# Patient Record
Sex: FEMALE | Race: WHITE | URBAN - METROPOLITAN AREA
[De-identification: names, ages, dates, MRNs, and addresses within clinical notes are randomized per-mention and may not be internally consistent; named-entity substitution may affect disease eponyms.]

---

## 2021-11-02 ENCOUNTER — OFFICE VISIT (OUTPATIENT)
Dept: URBAN - METROPOLITAN AREA CLINIC 98 | Facility: CLINIC | Age: 76
End: 2021-11-02

## 2022-01-04 ENCOUNTER — OFFICE VISIT (OUTPATIENT)
Dept: URBAN - METROPOLITAN AREA CLINIC 98 | Facility: CLINIC | Age: 77
End: 2022-01-04
Payer: MEDICARE

## 2022-01-04 ENCOUNTER — WEB ENCOUNTER (OUTPATIENT)
Dept: URBAN - METROPOLITAN AREA CLINIC 98 | Facility: CLINIC | Age: 77
End: 2022-01-04

## 2022-01-04 VITALS
HEART RATE: 78 BPM | HEIGHT: 63 IN | TEMPERATURE: 98.1 F | WEIGHT: 168 LBS | BODY MASS INDEX: 29.77 KG/M2 | SYSTOLIC BLOOD PRESSURE: 145 MMHG | DIASTOLIC BLOOD PRESSURE: 82 MMHG

## 2022-01-04 DIAGNOSIS — K59.01 CONSTIPATION BY DELAYED COLONIC TRANSIT: ICD-10-CM

## 2022-01-04 PROCEDURE — 99202 OFFICE O/P NEW SF 15 MIN: CPT | Performed by: INTERNAL MEDICINE

## 2022-01-04 RX ORDER — HYOSCYAMINE SULFATE 0.12 MG/1
TAKE 1 TABLET BY MOUTH FOUR TIMES DAILY AS NEEDED FOR ABDOMINAL PAIN OR SPASM TABLET ORAL
Qty: 30 | Refills: 0 | Status: ACTIVE | COMMUNITY

## 2022-01-04 RX ORDER — TIZANIDINE 4 MG/1
TAKE 1 TABLET BY MOUTH EVERY 8 HOURS AS NEEDED TABLET ORAL
Qty: 90 | Refills: 0 | Status: ACTIVE | COMMUNITY

## 2022-01-04 RX ORDER — PANTOPRAZOLE 40 MG/1
TABLET, DELAYED RELEASE ORAL
Qty: 30 | Status: ACTIVE | COMMUNITY

## 2022-01-04 RX ORDER — GABAPENTIN 300 MG/1
TAKE 1 CAPSULE BY MOUTH AT BEDTIME CAPSULE ORAL
Qty: 30 | Refills: 0 | Status: ACTIVE | COMMUNITY

## 2022-01-04 RX ORDER — EPINEPHRINE 0.3 MG/.3ML
INJECTION SUBCUTANEOUS
Qty: 1 | Status: ACTIVE | COMMUNITY

## 2022-01-04 RX ORDER — SULFAMETHOXAZOLE AND TRIMETHOPRIM 400; 80 MG/1; MG/1
TABLET ORAL
Qty: 28 | Status: ACTIVE | COMMUNITY

## 2022-01-04 RX ORDER — CELECOXIB 200 MG/1
CAPSULE ORAL
Qty: 90 | Status: ACTIVE | COMMUNITY

## 2022-01-04 RX ORDER — TRAMADOL HYDROCHLORIDE 50 MG/1
TAKE 1 TABLET BY MOUTH FOUR TIMES DAILY TABLET, COATED ORAL
Qty: 120 | Refills: 0 | Status: ACTIVE | COMMUNITY

## 2022-01-04 RX ORDER — OFLOXACIN OTIC 3 MG/ML
INSTILL 1 DROP LEFT EYE FOUR TIMES DAILY FOR 5-7 DAYS SOLUTION AURICULAR (OTIC)
Qty: 5 | Refills: 0 | Status: ACTIVE | COMMUNITY

## 2022-01-04 RX ORDER — BUPROPION HYDROCHLORIDE 150 MG/1
TABLET, EXTENDED RELEASE ORAL
Qty: 90 | Status: ACTIVE | COMMUNITY

## 2022-01-04 RX ORDER — MONTELUKAST SODIUM 10 MG/1
TAKE 1 TABLET BY MOUTH EVERY NIGHT TABLET, FILM COATED ORAL
Qty: 90 | Refills: 0 | Status: ACTIVE | COMMUNITY

## 2022-01-04 RX ORDER — PRAVASTATIN SODIUM 40 MG/1
TABLET ORAL
Qty: 90 | Status: ACTIVE | COMMUNITY

## 2022-01-04 RX ORDER — TRAZODONE HYDROCHLORIDE 50 MG/1
TABLET ORAL
Qty: 90 | Status: ACTIVE | COMMUNITY

## 2022-01-04 RX ORDER — ACETAZOLAMIDE 125 MG/1
TAKE 1 TO 2 TABLETS BY MOUTH EVERY DAY AS DIRECTED TABLET ORAL
Qty: 30 | Refills: 0 | Status: ACTIVE | COMMUNITY

## 2022-01-04 RX ORDER — FLUCONAZOLE 150 MG/1
TAKE 1 TABLET BY MOUTH 1 TIME. REPEAT IN 3 DAYS IF STILL WITH SYMPTOMS TABLET ORAL
Qty: 2 | Refills: 0 | Status: ACTIVE | COMMUNITY

## 2022-01-04 RX ORDER — PREDNISONE 20 MG/1
TAKE 3 TABLETS BY MOUTH EVERY DAY TABLET ORAL
Qty: 15 | Refills: 0 | Status: ACTIVE | COMMUNITY

## 2022-01-04 RX ORDER — LEVOTHYROXINE SODIUM 0.17 MG/1
TABLET ORAL
Qty: 90 | Status: ACTIVE | COMMUNITY

## 2022-01-04 RX ORDER — ACETAMINOPHEN AND CODEINE PHOSPHATE 300; 30 MG/1; MG/1
TABLET ORAL
Qty: 15 | Status: ACTIVE | COMMUNITY

## 2022-01-04 RX ORDER — AMLODIPINE BESYLATE 5 MG/1
TAKE 1 TABLET BY MOUTH EVERY DAY TABLET ORAL
Qty: 5 | Refills: 1 | Status: ACTIVE | COMMUNITY

## 2022-01-04 RX ORDER — METHYLPREDNISOLONE 8 MG/1
TAKE 4 TABLETS 12 HOURS PRIOR TO EXAM AND 4 TABLETS 2 HOURS PRIOR TO EXAM TABLET ORAL
Qty: 8 | Refills: 0 | Status: ACTIVE | COMMUNITY

## 2022-01-04 RX ORDER — OXYCODONE HYDROCHLORIDE 5 MG/1
TABLET ORAL
Qty: 75 | Status: ACTIVE | COMMUNITY

## 2022-01-04 RX ORDER — HUMAN IMMUNOGLOBULIN G 0.2 G/ML
LIQUID SUBCUTANEOUS
Qty: 400 | Status: ACTIVE | COMMUNITY

## 2022-01-04 RX ORDER — HYDROCODONE BITARTRATE AND ACETAMINOPHEN 5; 325 MG/1; MG/1
TABLET ORAL
Qty: 40 | Status: ACTIVE | COMMUNITY

## 2022-01-04 RX ORDER — OXYCODONE HYDROCHLORIDE AND ACETAMINOPHEN 5; 325 MG/1; MG/1
TAKE 1 TO 2 TABLETS BY MOUTH EVERY 4 HOURS AS NEEDED FOR PAIN TABLET ORAL
Qty: 39 | Refills: 0 | Status: ACTIVE | COMMUNITY

## 2022-01-04 RX ORDER — BUDESONIDE AND FORMOTEROL FUMARATE DIHYDRATE 80; 4.5 UG/1; UG/1
AEROSOL RESPIRATORY (INHALATION)
Qty: 30.6 | Status: ACTIVE | COMMUNITY

## 2022-01-04 RX ORDER — ESTRADIOL 0.1 MG/G
_INSERT 1 GRAM VAGINALLY TWICE WEEKLY CREAM VAGINAL
Qty: 42.5 | Refills: 0 | Status: ACTIVE | COMMUNITY

## 2022-01-04 RX ORDER — VANCOMYCIN HYDROCHLORIDE 125 MG/1
TAKE 1 CAPSULE BY MOUTH EVERY 6 HOURS FOR 10 DAYS CAPSULE ORAL
Qty: 40 | Refills: 0 | Status: ACTIVE | COMMUNITY

## 2022-01-04 RX ORDER — CEFUROXIME AXETIL 500 MG/1
TAKE 1 TABLET BY MOUTH TWICE DAILY TABLET, FILM COATED ORAL
Qty: 20 | Refills: 0 | Status: ACTIVE | COMMUNITY

## 2022-01-04 RX ORDER — MELOXICAM 15 MG/1
TABLET ORAL
Qty: 30 | Status: ACTIVE | COMMUNITY

## 2022-01-04 RX ORDER — CLOTRIMAZOLE 1 G/100G
APPLY TO AFFECTED AREA 2 TIMES A DAY CREAM TOPICAL
Qty: 30 | Refills: 0 | Status: ACTIVE | COMMUNITY

## 2022-01-04 RX ORDER — BENAZEPRIL HYDROCHLORIDE AND HYDROCHLOROTHIAZIDE 20; 12.5 MG/1; MG/1
TABLET ORAL
Qty: 90 | Status: ACTIVE | COMMUNITY

## 2022-01-04 RX ORDER — BUTALBITAL, ACETAMINOPHEN AND CAFFEINE 40; 50; 300 MG/1; MG/1; MG/1
TAKE 1 CAPSULE BY MOUTH EVERY 8 HOURS AS NEEDED DIAGNOSIS UNAVAILABLE CAPSULE ORAL
Qty: 90 | Refills: 0 | Status: ACTIVE | COMMUNITY

## 2022-01-04 RX ORDER — DULOXETINE 30 MG/1
CAPSULE, DELAYED RELEASE ORAL
Qty: 90 | Status: ACTIVE | COMMUNITY

## 2022-01-04 NOTE — HPI-OTHER HISTORIES
Here to establish GI care.  pCP Dr iCndi Childress referred.   Prior ischemic colitis in 2019  Dr Abram Kearns Colorectal surgery  had had banding of hemorrhoids  did have hemorrhoidectomy 1998 . last colonoscopy 2019 25-30 surgeries [facility].has laxity in joints.  Had bladder suspension, hysterectomy, umbilical hernia repair as infant  bowels moving well with Miralax hemorrhoids are enlarged but no problems.

## 2022-01-04 NOTE — PHYSICAL EXAM HENT:
Head,  normocephalic,  atraumatic,  Face,  Face within normal limits,  Ears,  External ears within normal limits,   no

## 2022-04-18 ENCOUNTER — TELEPHONE ENCOUNTER (OUTPATIENT)
Dept: URBAN - METROPOLITAN AREA CLINIC 98 | Facility: CLINIC | Age: 77
End: 2022-04-18

## 2022-05-05 ENCOUNTER — ERX REFILL RESPONSE (OUTPATIENT)
Dept: URBAN - METROPOLITAN AREA CLINIC 98 | Facility: CLINIC | Age: 77
End: 2022-05-05

## 2022-05-05 ENCOUNTER — OFFICE VISIT (OUTPATIENT)
Dept: URBAN - METROPOLITAN AREA CLINIC 98 | Facility: CLINIC | Age: 77
End: 2022-05-05
Payer: MEDICARE

## 2022-05-05 VITALS
WEIGHT: 166 LBS | BODY MASS INDEX: 29.41 KG/M2 | DIASTOLIC BLOOD PRESSURE: 72 MMHG | SYSTOLIC BLOOD PRESSURE: 138 MMHG | HEIGHT: 63 IN | HEART RATE: 69 BPM | TEMPERATURE: 97.6 F

## 2022-05-05 DIAGNOSIS — R10.84 ABDOMINAL PAIN, GENERALIZED: ICD-10-CM

## 2022-05-05 DIAGNOSIS — K59.01 CONSTIPATION BY DELAYED COLONIC TRANSIT: ICD-10-CM

## 2022-05-05 PROBLEM — 35298007: Status: ACTIVE | Noted: 2022-01-04

## 2022-05-05 PROCEDURE — 99213 OFFICE O/P EST LOW 20 MIN: CPT | Performed by: INTERNAL MEDICINE

## 2022-05-05 RX ORDER — TRAZODONE HYDROCHLORIDE 50 MG/1
TABLET ORAL
Qty: 90 | Status: ACTIVE | COMMUNITY

## 2022-05-05 RX ORDER — METHYLPREDNISOLONE 8 MG/1
TAKE 4 TABLETS 12 HOURS PRIOR TO EXAM AND 4 TABLETS 2 HOURS PRIOR TO EXAM TABLET ORAL
Qty: 8 | Refills: 0 | Status: ACTIVE | COMMUNITY

## 2022-05-05 RX ORDER — AMLODIPINE BESYLATE 5 MG/1
TAKE 1 TABLET BY MOUTH EVERY DAY TABLET ORAL
Qty: 5 | Refills: 1 | Status: ACTIVE | COMMUNITY

## 2022-05-05 RX ORDER — ACETAMINOPHEN AND CODEINE PHOSPHATE 300; 30 MG/1; MG/1
TABLET ORAL
Qty: 15 | Status: ACTIVE | COMMUNITY

## 2022-05-05 RX ORDER — MELOXICAM 15 MG/1
TABLET ORAL
Qty: 30 | Status: ACTIVE | COMMUNITY

## 2022-05-05 RX ORDER — OFLOXACIN OTIC 3 MG/ML
INSTILL 1 DROP LEFT EYE FOUR TIMES DAILY FOR 5-7 DAYS SOLUTION AURICULAR (OTIC)
Qty: 5 | Refills: 0 | Status: ACTIVE | COMMUNITY

## 2022-05-05 RX ORDER — CLOTRIMAZOLE 1 G/100G
APPLY TO AFFECTED AREA 2 TIMES A DAY CREAM TOPICAL
Qty: 30 | Refills: 0 | Status: ACTIVE | COMMUNITY

## 2022-05-05 RX ORDER — VANCOMYCIN HYDROCHLORIDE 125 MG/1
TAKE 1 CAPSULE BY MOUTH EVERY 6 HOURS FOR 10 DAYS CAPSULE ORAL
Qty: 40 | Refills: 0 | Status: ACTIVE | COMMUNITY

## 2022-05-05 RX ORDER — HUMAN IMMUNOGLOBULIN G 0.2 G/ML
LIQUID SUBCUTANEOUS
Qty: 400 | Status: ACTIVE | COMMUNITY

## 2022-05-05 RX ORDER — BUDESONIDE AND FORMOTEROL FUMARATE DIHYDRATE 80; 4.5 UG/1; UG/1
AEROSOL RESPIRATORY (INHALATION)
Qty: 30.6 | Status: ACTIVE | COMMUNITY

## 2022-05-05 RX ORDER — BUTALBITAL, ACETAMINOPHEN AND CAFFEINE 40; 50; 300 MG/1; MG/1; MG/1
TAKE 1 CAPSULE BY MOUTH EVERY 8 HOURS AS NEEDED DIAGNOSIS UNAVAILABLE CAPSULE ORAL
Qty: 90 | Refills: 0 | Status: ACTIVE | COMMUNITY

## 2022-05-05 RX ORDER — SULFAMETHOXAZOLE AND TRIMETHOPRIM 400; 80 MG/1; MG/1
TABLET ORAL
Qty: 28 | Status: ACTIVE | COMMUNITY

## 2022-05-05 RX ORDER — ESTRADIOL 0.1 MG/G
_INSERT 1 GRAM VAGINALLY TWICE WEEKLY CREAM VAGINAL
Qty: 42.5 | Refills: 0 | Status: ACTIVE | COMMUNITY

## 2022-05-05 RX ORDER — PREDNISONE 20 MG/1
TAKE 3 TABLETS BY MOUTH EVERY DAY TABLET ORAL
Qty: 15 | Refills: 0 | Status: ACTIVE | COMMUNITY

## 2022-05-05 RX ORDER — ACETAZOLAMIDE 125 MG/1
TAKE 1 TO 2 TABLETS BY MOUTH EVERY DAY AS DIRECTED TABLET ORAL
Qty: 30 | Refills: 0 | Status: ACTIVE | COMMUNITY

## 2022-05-05 RX ORDER — PRAVASTATIN SODIUM 40 MG/1
TABLET ORAL
Qty: 90 | Status: ACTIVE | COMMUNITY

## 2022-05-05 RX ORDER — DICYCLOMINE HYDROCHLORIDE 20 MG/1
1 TABLET TABLET ORAL
Qty: 90 | Refills: 1 | OUTPATIENT

## 2022-05-05 RX ORDER — FLUCONAZOLE 150 MG/1
TAKE 1 TABLET BY MOUTH 1 TIME. REPEAT IN 3 DAYS IF STILL WITH SYMPTOMS TABLET ORAL
Qty: 2 | Refills: 0 | Status: ACTIVE | COMMUNITY

## 2022-05-05 RX ORDER — EPINEPHRINE 0.3 MG/.3ML
INJECTION SUBCUTANEOUS
Qty: 1 | Status: ACTIVE | COMMUNITY

## 2022-05-05 RX ORDER — DULOXETINE 30 MG/1
CAPSULE, DELAYED RELEASE ORAL
Qty: 90 | Status: ACTIVE | COMMUNITY

## 2022-05-05 RX ORDER — OXYCODONE HYDROCHLORIDE AND ACETAMINOPHEN 5; 325 MG/1; MG/1
TAKE 1 TO 2 TABLETS BY MOUTH EVERY 4 HOURS AS NEEDED FOR PAIN TABLET ORAL
Qty: 39 | Refills: 0 | Status: ACTIVE | COMMUNITY

## 2022-05-05 RX ORDER — GABAPENTIN 300 MG/1
TAKE 1 CAPSULE BY MOUTH AT BEDTIME CAPSULE ORAL
Qty: 30 | Refills: 0 | Status: ACTIVE | COMMUNITY

## 2022-05-05 RX ORDER — TRAMADOL HYDROCHLORIDE 50 MG/1
TAKE 1 TABLET BY MOUTH FOUR TIMES DAILY TABLET, COATED ORAL
Qty: 120 | Refills: 0 | Status: ACTIVE | COMMUNITY

## 2022-05-05 RX ORDER — MONTELUKAST SODIUM 10 MG/1
TAKE 1 TABLET BY MOUTH EVERY NIGHT TABLET, FILM COATED ORAL
Qty: 90 | Refills: 0 | Status: ACTIVE | COMMUNITY

## 2022-05-05 RX ORDER — HYDROCODONE BITARTRATE AND ACETAMINOPHEN 5; 325 MG/1; MG/1
TABLET ORAL
Qty: 40 | Status: ACTIVE | COMMUNITY

## 2022-05-05 RX ORDER — OXYCODONE HYDROCHLORIDE 5 MG/1
TABLET ORAL
Qty: 75 | Status: ACTIVE | COMMUNITY

## 2022-05-05 RX ORDER — DICYCLOMINE HYDROCHLORIDE 20 MG/1
TAKE 1 TABLET BY MOUTH THREE TIMES DAILY AS NEEDED FOR PAIN TABLET ORAL
Qty: 270 TABLET | Refills: 1 | OUTPATIENT

## 2022-05-05 RX ORDER — PANTOPRAZOLE 40 MG/1
TABLET, DELAYED RELEASE ORAL
Qty: 30 | Status: ACTIVE | COMMUNITY

## 2022-05-05 RX ORDER — LEVOTHYROXINE SODIUM 0.17 MG/1
TABLET ORAL
Qty: 90 | Status: ACTIVE | COMMUNITY

## 2022-05-05 RX ORDER — CEFUROXIME AXETIL 500 MG/1
TAKE 1 TABLET BY MOUTH TWICE DAILY TABLET, FILM COATED ORAL
Qty: 20 | Refills: 0 | Status: ACTIVE | COMMUNITY

## 2022-05-05 RX ORDER — BUPROPION HYDROCHLORIDE 150 MG/1
TABLET, EXTENDED RELEASE ORAL
Qty: 90 | Status: ACTIVE | COMMUNITY

## 2022-05-05 RX ORDER — HYOSCYAMINE SULFATE 0.12 MG/1
TAKE 1 TABLET BY MOUTH FOUR TIMES DAILY AS NEEDED FOR ABDOMINAL PAIN OR SPASM TABLET ORAL
Qty: 30 | Refills: 0 | Status: ACTIVE | COMMUNITY

## 2022-05-05 RX ORDER — DICYCLOMINE HYDROCHLORIDE 20 MG/1
1 TABLET TABLET ORAL
Qty: 90 | Refills: 1 | OUTPATIENT
Start: 2022-05-05 | End: 2022-07-04

## 2022-05-05 RX ORDER — TIZANIDINE 4 MG/1
TAKE 1 TABLET BY MOUTH EVERY 8 HOURS AS NEEDED TABLET ORAL
Qty: 90 | Refills: 0 | Status: ACTIVE | COMMUNITY

## 2022-05-05 RX ORDER — BENAZEPRIL HYDROCHLORIDE AND HYDROCHLOROTHIAZIDE 20; 12.5 MG/1; MG/1
TABLET ORAL
Qty: 90 | Status: ACTIVE | COMMUNITY

## 2022-05-05 RX ORDER — CELECOXIB 200 MG/1
CAPSULE ORAL
Qty: 90 | Status: ACTIVE | COMMUNITY

## 2022-05-05 NOTE — HPI-OTHER HISTORIES
had hernia surgery 4/8: reports that post op was awful - with terrible gas pains.   a little constipated from time to time  passing gas helps or having bm does have prolapse  Dr Childress gave her hyoscyamine which helps somewhat but not completely  swims  nearly every day

## 2022-11-16 NOTE — PHYSICAL EXAM PSYCH:
----- Message from Aguilavladimir Therese sent at 11/16/2022  9:39 AM EST -----  Subject: Refill Request    QUESTIONS  Name of Medication? levothyroxine (SYNTHROID) 150 MCG tablet  Patient-reported dosage and instructions? 150mcg, 1 daily  How many days do you have left? 7  Preferred Pharmacy? [address] phone number (if available)? [phone]  ---------------------------------------------------------------------------  --------------  CALL BACK INFO  What is the best way for the office to contact you? OK to leave message on   voicemail  Preferred Call Back Phone Number? [phone]  ---------------------------------------------------------------------------  --------------  SCRIPT ANSWERS  Relationship to Patient?  Self normal mood with appropriate affect , cooperative with exam , cognitive function intact , judgement and insight good

## 2022-12-05 ENCOUNTER — LAB OUTSIDE AN ENCOUNTER (OUTPATIENT)
Dept: URBAN - METROPOLITAN AREA CLINIC 98 | Facility: CLINIC | Age: 77
End: 2022-12-05

## 2022-12-05 ENCOUNTER — OFFICE VISIT (OUTPATIENT)
Dept: URBAN - METROPOLITAN AREA CLINIC 98 | Facility: CLINIC | Age: 77
End: 2022-12-05
Payer: MEDICARE

## 2022-12-05 ENCOUNTER — WEB ENCOUNTER (OUTPATIENT)
Dept: URBAN - METROPOLITAN AREA CLINIC 98 | Facility: CLINIC | Age: 77
End: 2022-12-05

## 2022-12-05 VITALS
DIASTOLIC BLOOD PRESSURE: 85 MMHG | HEART RATE: 69 BPM | WEIGHT: 167 LBS | TEMPERATURE: 97.9 F | BODY MASS INDEX: 29.59 KG/M2 | SYSTOLIC BLOOD PRESSURE: 154 MMHG | HEIGHT: 63 IN

## 2022-12-05 DIAGNOSIS — K92.1 HEMATOCHEZIA: ICD-10-CM

## 2022-12-05 DIAGNOSIS — R19.7 DIARRHEA OF PRESUMED INFECTIOUS ORIGIN: ICD-10-CM

## 2022-12-05 PROCEDURE — 99214 OFFICE O/P EST MOD 30 MIN: CPT | Performed by: INTERNAL MEDICINE

## 2022-12-05 RX ORDER — CLOTRIMAZOLE 1 G/100G
APPLY TO AFFECTED AREA 2 TIMES A DAY CREAM TOPICAL
Qty: 30 | Refills: 0 | Status: ACTIVE | COMMUNITY

## 2022-12-05 RX ORDER — OXYCODONE HYDROCHLORIDE AND ACETAMINOPHEN 5; 325 MG/1; MG/1
TAKE 1 TO 2 TABLETS BY MOUTH EVERY 4 HOURS AS NEEDED FOR PAIN TABLET ORAL
Qty: 39 | Refills: 0 | Status: ACTIVE | COMMUNITY

## 2022-12-05 RX ORDER — AMLODIPINE BESYLATE 5 MG/1
TAKE 1 TABLET BY MOUTH EVERY DAY TABLET ORAL
Qty: 5 | Refills: 1 | Status: ACTIVE | COMMUNITY

## 2022-12-05 RX ORDER — GABAPENTIN 300 MG/1
TAKE 1 CAPSULE BY MOUTH AT BEDTIME CAPSULE ORAL
Qty: 30 | Refills: 0 | Status: ACTIVE | COMMUNITY

## 2022-12-05 RX ORDER — ACETAZOLAMIDE 125 MG/1
TAKE 1 TO 2 TABLETS BY MOUTH EVERY DAY AS DIRECTED TABLET ORAL
Qty: 30 | Refills: 0 | Status: ACTIVE | COMMUNITY

## 2022-12-05 RX ORDER — CELECOXIB 200 MG/1
CAPSULE ORAL
Qty: 90 | Status: ACTIVE | COMMUNITY

## 2022-12-05 RX ORDER — PREDNISONE 20 MG/1
TAKE 3 TABLETS BY MOUTH EVERY DAY TABLET ORAL
Qty: 15 | Refills: 0 | Status: ACTIVE | COMMUNITY

## 2022-12-05 RX ORDER — TRAMADOL HYDROCHLORIDE 50 MG/1
TAKE 1 TABLET BY MOUTH FOUR TIMES DAILY TABLET, COATED ORAL
Qty: 120 | Refills: 0 | Status: ACTIVE | COMMUNITY

## 2022-12-05 RX ORDER — BUTALBITAL, ACETAMINOPHEN AND CAFFEINE 40; 50; 300 MG/1; MG/1; MG/1
TAKE 1 CAPSULE BY MOUTH EVERY 8 HOURS AS NEEDED DIAGNOSIS UNAVAILABLE CAPSULE ORAL
Qty: 90 | Refills: 0 | Status: ACTIVE | COMMUNITY

## 2022-12-05 RX ORDER — PRAVASTATIN SODIUM 40 MG/1
TABLET ORAL
Qty: 90 | Status: ACTIVE | COMMUNITY

## 2022-12-05 RX ORDER — BUPROPION HYDROCHLORIDE 150 MG/1
TABLET, EXTENDED RELEASE ORAL
Qty: 90 | Status: ACTIVE | COMMUNITY

## 2022-12-05 RX ORDER — PANTOPRAZOLE 40 MG/1
TABLET, DELAYED RELEASE ORAL
Qty: 30 | Status: ACTIVE | COMMUNITY

## 2022-12-05 RX ORDER — EPINEPHRINE 0.3 MG/.3ML
INJECTION SUBCUTANEOUS
Qty: 1 | Status: ACTIVE | COMMUNITY

## 2022-12-05 RX ORDER — METHYLPREDNISOLONE 8 MG/1
TAKE 4 TABLETS 12 HOURS PRIOR TO EXAM AND 4 TABLETS 2 HOURS PRIOR TO EXAM TABLET ORAL
Qty: 8 | Refills: 0 | Status: ACTIVE | COMMUNITY

## 2022-12-05 RX ORDER — BENAZEPRIL HYDROCHLORIDE AND HYDROCHLOROTHIAZIDE 20; 12.5 MG/1; MG/1
TABLET ORAL
Qty: 90 | Status: ACTIVE | COMMUNITY

## 2022-12-05 RX ORDER — VANCOMYCIN HYDROCHLORIDE 125 MG/1
TAKE 1 CAPSULE BY MOUTH EVERY 6 HOURS FOR 10 DAYS CAPSULE ORAL
Qty: 40 | Refills: 0 | Status: ACTIVE | COMMUNITY

## 2022-12-05 RX ORDER — TRAZODONE HYDROCHLORIDE 50 MG/1
TABLET ORAL
Qty: 90 | Status: ACTIVE | COMMUNITY

## 2022-12-05 RX ORDER — TIZANIDINE 4 MG/1
TAKE 1 TABLET BY MOUTH EVERY 8 HOURS AS NEEDED TABLET ORAL
Qty: 90 | Refills: 0 | Status: ACTIVE | COMMUNITY

## 2022-12-05 RX ORDER — HUMAN IMMUNOGLOBULIN G 0.2 G/ML
LIQUID SUBCUTANEOUS
Qty: 400 | Status: ACTIVE | COMMUNITY

## 2022-12-05 RX ORDER — CEFUROXIME AXETIL 500 MG/1
TAKE 1 TABLET BY MOUTH TWICE DAILY TABLET, FILM COATED ORAL
Qty: 20 | Refills: 0 | Status: ACTIVE | COMMUNITY

## 2022-12-05 RX ORDER — MELOXICAM 15 MG/1
TABLET ORAL
Qty: 30 | Status: ACTIVE | COMMUNITY

## 2022-12-05 RX ORDER — LEVOTHYROXINE SODIUM 0.17 MG/1
TABLET ORAL
Qty: 90 | Status: ACTIVE | COMMUNITY

## 2022-12-05 RX ORDER — HYDROCODONE BITARTRATE AND ACETAMINOPHEN 5; 325 MG/1; MG/1
TABLET ORAL
Qty: 40 | Status: ACTIVE | COMMUNITY

## 2022-12-05 RX ORDER — FLUCONAZOLE 150 MG/1
TAKE 1 TABLET BY MOUTH 1 TIME. REPEAT IN 3 DAYS IF STILL WITH SYMPTOMS TABLET ORAL
Qty: 2 | Refills: 0 | Status: ACTIVE | COMMUNITY

## 2022-12-05 RX ORDER — MONTELUKAST SODIUM 10 MG/1
TAKE 1 TABLET BY MOUTH EVERY NIGHT TABLET, FILM COATED ORAL
Qty: 90 | Refills: 0 | Status: ACTIVE | COMMUNITY

## 2022-12-05 RX ORDER — SULFAMETHOXAZOLE AND TRIMETHOPRIM 400; 80 MG/1; MG/1
TABLET ORAL
Qty: 28 | Status: ACTIVE | COMMUNITY

## 2022-12-05 RX ORDER — DULOXETINE 30 MG/1
CAPSULE, DELAYED RELEASE ORAL
Qty: 90 | Status: ACTIVE | COMMUNITY

## 2022-12-05 RX ORDER — OFLOXACIN OTIC 3 MG/ML
INSTILL 1 DROP LEFT EYE FOUR TIMES DAILY FOR 5-7 DAYS SOLUTION AURICULAR (OTIC)
Qty: 5 | Refills: 0 | Status: ACTIVE | COMMUNITY

## 2022-12-05 RX ORDER — ACETAMINOPHEN AND CODEINE PHOSPHATE 300; 30 MG/1; MG/1
TABLET ORAL
Qty: 15 | Status: ACTIVE | COMMUNITY

## 2022-12-05 RX ORDER — DICYCLOMINE HYDROCHLORIDE 20 MG/1
TAKE 1 TABLET BY MOUTH THREE TIMES DAILY AS NEEDED FOR PAIN TABLET ORAL
Qty: 270 TABLET | Refills: 1 | Status: ACTIVE | COMMUNITY

## 2022-12-05 RX ORDER — OXYCODONE HYDROCHLORIDE 5 MG/1
TABLET ORAL
Qty: 75 | Status: ACTIVE | COMMUNITY

## 2022-12-05 RX ORDER — ESTRADIOL 0.1 MG/G
_INSERT 1 GRAM VAGINALLY TWICE WEEKLY CREAM VAGINAL
Qty: 42.5 | Refills: 0 | Status: ACTIVE | COMMUNITY

## 2022-12-05 RX ORDER — HYOSCYAMINE SULFATE 0.12 MG/1
TAKE 1 TABLET BY MOUTH FOUR TIMES DAILY AS NEEDED FOR ABDOMINAL PAIN OR SPASM TABLET ORAL
Qty: 30 | Refills: 0 | Status: ACTIVE | COMMUNITY

## 2022-12-05 RX ORDER — BUDESONIDE AND FORMOTEROL FUMARATE DIHYDRATE 80; 4.5 UG/1; UG/1
AEROSOL RESPIRATORY (INHALATION)
Qty: 30.6 | Status: ACTIVE | COMMUNITY

## 2022-12-05 NOTE — HPI-TODAY'S VISIT:
Friday 1 wk ago - got bad pain in abdomen.  had black stools for weeks - then had red stool went to see Dr Childress last week  went to [facility] ER Friday 12/2.   Admitted and stayed overnight- sx continued, still w diarrhea and balck stools  stopped celebrex BM 3-4 x / week  stool samples taken but only tested for heme not infection - i reviewed NS chart

## 2022-12-07 ENCOUNTER — OFFICE VISIT (OUTPATIENT)
Dept: URBAN - METROPOLITAN AREA SURGERY CENTER 18 | Facility: SURGERY CENTER | Age: 77
End: 2022-12-07

## 2022-12-07 ENCOUNTER — CLAIMS CREATED FROM THE CLAIM WINDOW (OUTPATIENT)
Dept: URBAN - METROPOLITAN AREA SURGERY CENTER 18 | Facility: SURGERY CENTER | Age: 77
End: 2022-12-07
Payer: MEDICARE

## 2022-12-07 ENCOUNTER — CLAIMS CREATED FROM THE CLAIM WINDOW (OUTPATIENT)
Dept: URBAN - METROPOLITAN AREA CLINIC 4 | Facility: CLINIC | Age: 77
End: 2022-12-07
Payer: MEDICARE

## 2022-12-07 DIAGNOSIS — K31.89 ACQUIRED DEFORMITY OF DUODENUM: ICD-10-CM

## 2022-12-07 DIAGNOSIS — K31.89 OTHER DISEASES OF STOMACH AND DUODENUM: ICD-10-CM

## 2022-12-07 DIAGNOSIS — K21.9 ACID REFLUX: ICD-10-CM

## 2022-12-07 DIAGNOSIS — R19.7 ACUTE DIARRHEA: ICD-10-CM

## 2022-12-07 DIAGNOSIS — D12.2 ADENOMA OF ASCENDING COLON: ICD-10-CM

## 2022-12-07 DIAGNOSIS — K29.70 GASTRITIS, UNSPECIFIED, WITHOUT BLEEDING: ICD-10-CM

## 2022-12-07 PROCEDURE — G8907 PT DOC NO EVENTS ON DISCHARG: HCPCS | Performed by: INTERNAL MEDICINE

## 2022-12-07 PROCEDURE — 43239 EGD BIOPSY SINGLE/MULTIPLE: CPT | Performed by: INTERNAL MEDICINE

## 2022-12-07 PROCEDURE — 45380 COLONOSCOPY AND BIOPSY: CPT | Performed by: INTERNAL MEDICINE

## 2022-12-07 PROCEDURE — 88305 TISSUE EXAM BY PATHOLOGIST: CPT | Performed by: PATHOLOGY

## 2022-12-07 PROCEDURE — 88312 SPECIAL STAINS GROUP 1: CPT | Performed by: PATHOLOGY

## 2022-12-07 RX ORDER — CEFUROXIME AXETIL 500 MG/1
TAKE 1 TABLET BY MOUTH TWICE DAILY TABLET, FILM COATED ORAL
Qty: 20 | Refills: 0 | Status: ACTIVE | COMMUNITY

## 2022-12-07 RX ORDER — HYDROCODONE BITARTRATE AND ACETAMINOPHEN 5; 325 MG/1; MG/1
TABLET ORAL
Qty: 40 | Status: ACTIVE | COMMUNITY

## 2022-12-07 RX ORDER — ACETAMINOPHEN AND CODEINE PHOSPHATE 300; 30 MG/1; MG/1
TABLET ORAL
Qty: 15 | Status: ACTIVE | COMMUNITY

## 2022-12-07 RX ORDER — MONTELUKAST SODIUM 10 MG/1
TAKE 1 TABLET BY MOUTH EVERY NIGHT TABLET, FILM COATED ORAL
Qty: 90 | Refills: 0 | Status: ACTIVE | COMMUNITY

## 2022-12-07 RX ORDER — BUPROPION HYDROCHLORIDE 150 MG/1
TABLET, EXTENDED RELEASE ORAL
Qty: 90 | Status: ACTIVE | COMMUNITY

## 2022-12-07 RX ORDER — SULFAMETHOXAZOLE AND TRIMETHOPRIM 400; 80 MG/1; MG/1
TABLET ORAL
Qty: 28 | Status: ACTIVE | COMMUNITY

## 2022-12-07 RX ORDER — BENAZEPRIL HYDROCHLORIDE AND HYDROCHLOROTHIAZIDE 20; 12.5 MG/1; MG/1
TABLET ORAL
Qty: 90 | Status: ACTIVE | COMMUNITY

## 2022-12-07 RX ORDER — MELOXICAM 15 MG/1
TABLET ORAL
Qty: 30 | Status: ACTIVE | COMMUNITY

## 2022-12-07 RX ORDER — METHYLPREDNISOLONE 8 MG/1
TAKE 4 TABLETS 12 HOURS PRIOR TO EXAM AND 4 TABLETS 2 HOURS PRIOR TO EXAM TABLET ORAL
Qty: 8 | Refills: 0 | Status: ACTIVE | COMMUNITY

## 2022-12-07 RX ORDER — FLUCONAZOLE 150 MG/1
TAKE 1 TABLET BY MOUTH 1 TIME. REPEAT IN 3 DAYS IF STILL WITH SYMPTOMS TABLET ORAL
Qty: 2 | Refills: 0 | Status: ACTIVE | COMMUNITY

## 2022-12-07 RX ORDER — HYOSCYAMINE SULFATE 0.12 MG/1
TAKE 1 TABLET BY MOUTH FOUR TIMES DAILY AS NEEDED FOR ABDOMINAL PAIN OR SPASM TABLET ORAL
Qty: 30 | Refills: 0 | Status: ACTIVE | COMMUNITY

## 2022-12-07 RX ORDER — TRAZODONE HYDROCHLORIDE 50 MG/1
TABLET ORAL
Qty: 90 | Status: ACTIVE | COMMUNITY

## 2022-12-07 RX ORDER — EPINEPHRINE 0.3 MG/.3ML
INJECTION SUBCUTANEOUS
Qty: 1 | Status: ACTIVE | COMMUNITY

## 2022-12-07 RX ORDER — BUTALBITAL, ACETAMINOPHEN AND CAFFEINE 40; 50; 300 MG/1; MG/1; MG/1
TAKE 1 CAPSULE BY MOUTH EVERY 8 HOURS AS NEEDED DIAGNOSIS UNAVAILABLE CAPSULE ORAL
Qty: 90 | Refills: 0 | Status: ACTIVE | COMMUNITY

## 2022-12-07 RX ORDER — VANCOMYCIN HYDROCHLORIDE 125 MG/1
TAKE 1 CAPSULE BY MOUTH EVERY 6 HOURS FOR 10 DAYS CAPSULE ORAL
Qty: 40 | Refills: 0 | Status: ACTIVE | COMMUNITY

## 2022-12-07 RX ORDER — TIZANIDINE 4 MG/1
TAKE 1 TABLET BY MOUTH EVERY 8 HOURS AS NEEDED TABLET ORAL
Qty: 90 | Refills: 0 | Status: ACTIVE | COMMUNITY

## 2022-12-07 RX ORDER — PRAVASTATIN SODIUM 40 MG/1
TABLET ORAL
Qty: 90 | Status: ACTIVE | COMMUNITY

## 2022-12-07 RX ORDER — LEVOTHYROXINE SODIUM 0.17 MG/1
TABLET ORAL
Qty: 90 | Status: ACTIVE | COMMUNITY

## 2022-12-07 RX ORDER — DULOXETINE 30 MG/1
CAPSULE, DELAYED RELEASE ORAL
Qty: 90 | Status: ACTIVE | COMMUNITY

## 2022-12-07 RX ORDER — TRAMADOL HYDROCHLORIDE 50 MG/1
TAKE 1 TABLET BY MOUTH FOUR TIMES DAILY TABLET, COATED ORAL
Qty: 120 | Refills: 0 | Status: ACTIVE | COMMUNITY

## 2022-12-07 RX ORDER — PREDNISONE 20 MG/1
TAKE 3 TABLETS BY MOUTH EVERY DAY TABLET ORAL
Qty: 15 | Refills: 0 | Status: ACTIVE | COMMUNITY

## 2022-12-07 RX ORDER — AMLODIPINE BESYLATE 5 MG/1
TAKE 1 TABLET BY MOUTH EVERY DAY TABLET ORAL
Qty: 5 | Refills: 1 | Status: ACTIVE | COMMUNITY

## 2022-12-07 RX ORDER — CLOTRIMAZOLE 1 G/100G
APPLY TO AFFECTED AREA 2 TIMES A DAY CREAM TOPICAL
Qty: 30 | Refills: 0 | Status: ACTIVE | COMMUNITY

## 2022-12-07 RX ORDER — OXYCODONE HYDROCHLORIDE AND ACETAMINOPHEN 5; 325 MG/1; MG/1
TAKE 1 TO 2 TABLETS BY MOUTH EVERY 4 HOURS AS NEEDED FOR PAIN TABLET ORAL
Qty: 39 | Refills: 0 | Status: ACTIVE | COMMUNITY

## 2022-12-07 RX ORDER — PANTOPRAZOLE 40 MG/1
TABLET, DELAYED RELEASE ORAL
Qty: 30 | Status: ACTIVE | COMMUNITY

## 2022-12-07 RX ORDER — GABAPENTIN 300 MG/1
TAKE 1 CAPSULE BY MOUTH AT BEDTIME CAPSULE ORAL
Qty: 30 | Refills: 0 | Status: ACTIVE | COMMUNITY

## 2022-12-07 RX ORDER — OXYCODONE HYDROCHLORIDE 5 MG/1
TABLET ORAL
Qty: 75 | Status: ACTIVE | COMMUNITY

## 2022-12-07 RX ORDER — DICYCLOMINE HYDROCHLORIDE 20 MG/1
TAKE 1 TABLET BY MOUTH THREE TIMES DAILY AS NEEDED FOR PAIN TABLET ORAL
Qty: 270 TABLET | Refills: 1 | Status: ACTIVE | COMMUNITY

## 2022-12-07 RX ORDER — BUDESONIDE AND FORMOTEROL FUMARATE DIHYDRATE 80; 4.5 UG/1; UG/1
AEROSOL RESPIRATORY (INHALATION)
Qty: 30.6 | Status: ACTIVE | COMMUNITY

## 2022-12-07 RX ORDER — OFLOXACIN OTIC 3 MG/ML
INSTILL 1 DROP LEFT EYE FOUR TIMES DAILY FOR 5-7 DAYS SOLUTION AURICULAR (OTIC)
Qty: 5 | Refills: 0 | Status: ACTIVE | COMMUNITY

## 2022-12-07 RX ORDER — ACETAZOLAMIDE 125 MG/1
TAKE 1 TO 2 TABLETS BY MOUTH EVERY DAY AS DIRECTED TABLET ORAL
Qty: 30 | Refills: 0 | Status: ACTIVE | COMMUNITY

## 2022-12-07 RX ORDER — ESTRADIOL 0.1 MG/G
_INSERT 1 GRAM VAGINALLY TWICE WEEKLY CREAM VAGINAL
Qty: 42.5 | Refills: 0 | Status: ACTIVE | COMMUNITY

## 2022-12-07 RX ORDER — HUMAN IMMUNOGLOBULIN G 0.2 G/ML
LIQUID SUBCUTANEOUS
Qty: 400 | Status: ACTIVE | COMMUNITY

## 2022-12-07 RX ORDER — CELECOXIB 200 MG/1
CAPSULE ORAL
Qty: 90 | Status: ACTIVE | COMMUNITY

## 2022-12-12 ENCOUNTER — TELEPHONE ENCOUNTER (OUTPATIENT)
Dept: URBAN - METROPOLITAN AREA CLINIC 98 | Facility: CLINIC | Age: 77
End: 2022-12-12

## 2022-12-12 ENCOUNTER — WEB ENCOUNTER (OUTPATIENT)
Dept: URBAN - METROPOLITAN AREA CLINIC 98 | Facility: CLINIC | Age: 77
End: 2022-12-12

## 2022-12-12 RX ORDER — ONDANSETRON 4 MG/1
1-2 TABLET ON THE TONGUE AND ALLOW TO DISSOLVE TABLET, ORALLY DISINTEGRATING ORAL
Qty: 60 | Refills: 1 | OUTPATIENT

## 2022-12-13 ENCOUNTER — WEB ENCOUNTER (OUTPATIENT)
Dept: URBAN - METROPOLITAN AREA CLINIC 98 | Facility: CLINIC | Age: 77
End: 2022-12-13

## 2022-12-27 ENCOUNTER — TELEPHONE ENCOUNTER (OUTPATIENT)
Dept: URBAN - METROPOLITAN AREA CLINIC 98 | Facility: CLINIC | Age: 77
End: 2022-12-27

## 2022-12-27 ENCOUNTER — OFFICE VISIT (OUTPATIENT)
Dept: URBAN - METROPOLITAN AREA CLINIC 98 | Facility: CLINIC | Age: 77
End: 2022-12-27

## 2022-12-27 RX ORDER — CELECOXIB 200 MG/1
CAPSULE ORAL
Qty: 90 | Status: ACTIVE | COMMUNITY

## 2022-12-27 RX ORDER — MONTELUKAST SODIUM 10 MG/1
TAKE 1 TABLET BY MOUTH EVERY NIGHT TABLET, FILM COATED ORAL
Qty: 90 | Refills: 0 | Status: ACTIVE | COMMUNITY

## 2022-12-27 RX ORDER — ESTRADIOL 0.1 MG/G
_INSERT 1 GRAM VAGINALLY TWICE WEEKLY CREAM VAGINAL
Qty: 42.5 | Refills: 0 | Status: ACTIVE | COMMUNITY

## 2022-12-27 RX ORDER — OXYCODONE HYDROCHLORIDE 5 MG/1
TABLET ORAL
Qty: 75 | Status: ACTIVE | COMMUNITY

## 2022-12-27 RX ORDER — ONDANSETRON 4 MG/1
1-2 TABLET ON THE TONGUE AND ALLOW TO DISSOLVE TABLET, ORALLY DISINTEGRATING ORAL
Qty: 60 | Refills: 1 | Status: ACTIVE | COMMUNITY

## 2022-12-27 RX ORDER — MELOXICAM 15 MG/1
TABLET ORAL
Qty: 30 | Status: ACTIVE | COMMUNITY

## 2022-12-27 RX ORDER — LEVOTHYROXINE SODIUM 0.17 MG/1
TABLET ORAL
Qty: 90 | Status: ACTIVE | COMMUNITY

## 2022-12-27 RX ORDER — EPINEPHRINE 0.3 MG/.3ML
INJECTION SUBCUTANEOUS
Qty: 1 | Status: ACTIVE | COMMUNITY

## 2022-12-27 RX ORDER — ACETAZOLAMIDE 125 MG/1
TAKE 1 TO 2 TABLETS BY MOUTH EVERY DAY AS DIRECTED TABLET ORAL
Qty: 30 | Refills: 0 | Status: ACTIVE | COMMUNITY

## 2022-12-27 RX ORDER — DICYCLOMINE HYDROCHLORIDE 20 MG/1
TAKE 1 TABLET BY MOUTH THREE TIMES DAILY AS NEEDED FOR PAIN TABLET ORAL
Qty: 270 TABLET | Refills: 1 | Status: ACTIVE | COMMUNITY

## 2022-12-27 RX ORDER — FLUCONAZOLE 150 MG/1
TAKE 1 TABLET BY MOUTH 1 TIME. REPEAT IN 3 DAYS IF STILL WITH SYMPTOMS TABLET ORAL
Qty: 2 | Refills: 0 | Status: ACTIVE | COMMUNITY

## 2022-12-27 RX ORDER — CEFUROXIME AXETIL 500 MG/1
TAKE 1 TABLET BY MOUTH TWICE DAILY TABLET, FILM COATED ORAL
Qty: 20 | Refills: 0 | Status: ACTIVE | COMMUNITY

## 2022-12-27 RX ORDER — DULOXETINE 30 MG/1
CAPSULE, DELAYED RELEASE ORAL
Qty: 90 | Status: ACTIVE | COMMUNITY

## 2022-12-27 RX ORDER — OFLOXACIN OTIC 3 MG/ML
INSTILL 1 DROP LEFT EYE FOUR TIMES DAILY FOR 5-7 DAYS SOLUTION AURICULAR (OTIC)
Qty: 5 | Refills: 0 | Status: ACTIVE | COMMUNITY

## 2022-12-27 RX ORDER — PANTOPRAZOLE 40 MG/1
TABLET, DELAYED RELEASE ORAL
Qty: 30 | Status: ACTIVE | COMMUNITY

## 2022-12-27 RX ORDER — ACETAMINOPHEN AND CODEINE PHOSPHATE 300; 30 MG/1; MG/1
TABLET ORAL
Qty: 15 | Status: ACTIVE | COMMUNITY

## 2022-12-27 RX ORDER — BUTALBITAL, ACETAMINOPHEN AND CAFFEINE 40; 50; 300 MG/1; MG/1; MG/1
TAKE 1 CAPSULE BY MOUTH EVERY 8 HOURS AS NEEDED DIAGNOSIS UNAVAILABLE CAPSULE ORAL
Qty: 90 | Refills: 0 | Status: ACTIVE | COMMUNITY

## 2022-12-27 RX ORDER — HUMAN IMMUNOGLOBULIN G 0.2 G/ML
LIQUID SUBCUTANEOUS
Qty: 400 | Status: ACTIVE | COMMUNITY

## 2022-12-27 RX ORDER — TRAMADOL HYDROCHLORIDE 50 MG/1
TAKE 1 TABLET BY MOUTH FOUR TIMES DAILY TABLET, COATED ORAL
Qty: 120 | Refills: 0 | Status: ACTIVE | COMMUNITY

## 2022-12-27 RX ORDER — METHYLPREDNISOLONE 8 MG/1
TAKE 4 TABLETS 12 HOURS PRIOR TO EXAM AND 4 TABLETS 2 HOURS PRIOR TO EXAM TABLET ORAL
Qty: 8 | Refills: 0 | Status: ACTIVE | COMMUNITY

## 2022-12-27 RX ORDER — BUPROPION HYDROCHLORIDE 150 MG/1
TABLET, EXTENDED RELEASE ORAL
Qty: 90 | Status: ACTIVE | COMMUNITY

## 2022-12-27 RX ORDER — BUDESONIDE AND FORMOTEROL FUMARATE DIHYDRATE 80; 4.5 UG/1; UG/1
AEROSOL RESPIRATORY (INHALATION)
Qty: 30.6 | Status: ACTIVE | COMMUNITY

## 2022-12-27 RX ORDER — GABAPENTIN 300 MG/1
TAKE 1 CAPSULE BY MOUTH AT BEDTIME CAPSULE ORAL
Qty: 30 | Refills: 0 | Status: ACTIVE | COMMUNITY

## 2022-12-27 RX ORDER — PRAVASTATIN SODIUM 40 MG/1
TABLET ORAL
Qty: 90 | Status: ACTIVE | COMMUNITY

## 2022-12-27 RX ORDER — HYOSCYAMINE SULFATE 0.12 MG/1
TAKE 1 TABLET BY MOUTH FOUR TIMES DAILY AS NEEDED FOR ABDOMINAL PAIN OR SPASM TABLET ORAL
Qty: 30 | Refills: 0 | Status: ACTIVE | COMMUNITY

## 2022-12-27 RX ORDER — OXYCODONE HYDROCHLORIDE AND ACETAMINOPHEN 5; 325 MG/1; MG/1
TAKE 1 TO 2 TABLETS BY MOUTH EVERY 4 HOURS AS NEEDED FOR PAIN TABLET ORAL
Qty: 39 | Refills: 0 | Status: ACTIVE | COMMUNITY

## 2022-12-27 RX ORDER — SULFAMETHOXAZOLE AND TRIMETHOPRIM 400; 80 MG/1; MG/1
TABLET ORAL
Qty: 28 | Status: ACTIVE | COMMUNITY

## 2022-12-27 RX ORDER — PREDNISONE 20 MG/1
TAKE 3 TABLETS BY MOUTH EVERY DAY TABLET ORAL
Qty: 15 | Refills: 0 | Status: ACTIVE | COMMUNITY

## 2022-12-27 RX ORDER — BENAZEPRIL HYDROCHLORIDE AND HYDROCHLOROTHIAZIDE 20; 12.5 MG/1; MG/1
TABLET ORAL
Qty: 90 | Status: ACTIVE | COMMUNITY

## 2022-12-27 RX ORDER — CLOTRIMAZOLE 1 G/100G
APPLY TO AFFECTED AREA 2 TIMES A DAY CREAM TOPICAL
Qty: 30 | Refills: 0 | Status: ACTIVE | COMMUNITY

## 2022-12-27 RX ORDER — HYDROCODONE BITARTRATE AND ACETAMINOPHEN 5; 325 MG/1; MG/1
TABLET ORAL
Qty: 40 | Status: ACTIVE | COMMUNITY

## 2022-12-27 RX ORDER — TIZANIDINE 4 MG/1
TAKE 1 TABLET BY MOUTH EVERY 8 HOURS AS NEEDED TABLET ORAL
Qty: 90 | Refills: 0 | Status: ACTIVE | COMMUNITY

## 2022-12-27 RX ORDER — AMLODIPINE BESYLATE 5 MG/1
TAKE 1 TABLET BY MOUTH EVERY DAY TABLET ORAL
Qty: 5 | Refills: 1 | Status: ACTIVE | COMMUNITY

## 2022-12-27 RX ORDER — TRAZODONE HYDROCHLORIDE 50 MG/1
TABLET ORAL
Qty: 90 | Status: ACTIVE | COMMUNITY

## 2022-12-27 RX ORDER — VANCOMYCIN HYDROCHLORIDE 125 MG/1
TAKE 1 CAPSULE BY MOUTH EVERY 6 HOURS FOR 10 DAYS CAPSULE ORAL
Qty: 40 | Refills: 0 | Status: ACTIVE | COMMUNITY

## 2023-02-14 ENCOUNTER — OFFICE VISIT (OUTPATIENT)
Dept: URBAN - METROPOLITAN AREA CLINIC 98 | Facility: CLINIC | Age: 78
End: 2023-02-14

## 2023-05-24 ENCOUNTER — TELEPHONE ENCOUNTER (OUTPATIENT)
Dept: URBAN - METROPOLITAN AREA CLINIC 6 | Facility: CLINIC | Age: 78
End: 2023-05-24

## 2023-05-25 ENCOUNTER — TELEPHONE ENCOUNTER (OUTPATIENT)
Dept: URBAN - METROPOLITAN AREA CLINIC 6 | Facility: CLINIC | Age: 78
End: 2023-05-25

## 2023-05-26 ENCOUNTER — OFFICE VISIT (OUTPATIENT)
Dept: URBAN - METROPOLITAN AREA TELEHEALTH 2 | Facility: TELEHEALTH | Age: 78
End: 2023-05-26
Payer: MEDICARE

## 2023-05-26 DIAGNOSIS — K52.89 OTHER SPECIFIED NONINFECTIVE GASTROENTERITIS AND COLITIS: ICD-10-CM

## 2023-05-26 PROCEDURE — 99213 OFFICE O/P EST LOW 20 MIN: CPT | Performed by: INTERNAL MEDICINE

## 2023-05-26 RX ORDER — EPINEPHRINE 0.3 MG/.3ML
INJECTION SUBCUTANEOUS
Qty: 1 | Status: ACTIVE | COMMUNITY

## 2023-05-26 RX ORDER — BUTALBITAL, ACETAMINOPHEN AND CAFFEINE 40; 50; 300 MG/1; MG/1; MG/1
TAKE 1 CAPSULE BY MOUTH EVERY 8 HOURS AS NEEDED DIAGNOSIS UNAVAILABLE CAPSULE ORAL
Qty: 90 | Refills: 0 | Status: ACTIVE | COMMUNITY

## 2023-05-26 RX ORDER — TRAMADOL HYDROCHLORIDE 50 MG/1
TAKE 1 TABLET BY MOUTH FOUR TIMES DAILY TABLET, COATED ORAL
Qty: 120 | Refills: 0 | Status: ACTIVE | COMMUNITY

## 2023-05-26 RX ORDER — PREDNISONE 20 MG/1
TAKE 3 TABLETS BY MOUTH EVERY DAY TABLET ORAL
Qty: 15 | Refills: 0 | Status: ACTIVE | COMMUNITY

## 2023-05-26 RX ORDER — FLUCONAZOLE 150 MG/1
TAKE 1 TABLET BY MOUTH 1 TIME. REPEAT IN 3 DAYS IF STILL WITH SYMPTOMS TABLET ORAL
Qty: 2 | Refills: 0 | Status: ACTIVE | COMMUNITY

## 2023-05-26 RX ORDER — TRAZODONE HYDROCHLORIDE 50 MG/1
TABLET ORAL
Qty: 90 | Status: ACTIVE | COMMUNITY

## 2023-05-26 RX ORDER — OFLOXACIN OTIC 3 MG/ML
INSTILL 1 DROP LEFT EYE FOUR TIMES DAILY FOR 5-7 DAYS SOLUTION AURICULAR (OTIC)
Qty: 5 | Refills: 0 | Status: ACTIVE | COMMUNITY

## 2023-05-26 RX ORDER — GABAPENTIN 300 MG/1
TAKE 1 CAPSULE BY MOUTH AT BEDTIME CAPSULE ORAL
Qty: 30 | Refills: 0 | Status: ACTIVE | COMMUNITY

## 2023-05-26 RX ORDER — ESTRADIOL 0.1 MG/G
_INSERT 1 GRAM VAGINALLY TWICE WEEKLY CREAM VAGINAL
Qty: 42.5 | Refills: 0 | Status: ACTIVE | COMMUNITY

## 2023-05-26 RX ORDER — DICYCLOMINE HYDROCHLORIDE 20 MG/1
TAKE 1 TABLET BY MOUTH THREE TIMES DAILY AS NEEDED FOR PAIN TABLET ORAL
Qty: 270 TABLET | Refills: 1 | Status: ACTIVE | COMMUNITY

## 2023-05-26 RX ORDER — VANCOMYCIN HYDROCHLORIDE 125 MG/1
TAKE 1 CAPSULE BY MOUTH EVERY 6 HOURS FOR 10 DAYS CAPSULE ORAL
Qty: 40 | Refills: 0 | Status: ACTIVE | COMMUNITY

## 2023-05-26 RX ORDER — BENAZEPRIL HYDROCHLORIDE AND HYDROCHLOROTHIAZIDE 20; 12.5 MG/1; MG/1
TABLET ORAL
Qty: 90 | Status: ACTIVE | COMMUNITY

## 2023-05-26 RX ORDER — METHYLPREDNISOLONE 8 MG/1
TAKE 4 TABLETS 12 HOURS PRIOR TO EXAM AND 4 TABLETS 2 HOURS PRIOR TO EXAM TABLET ORAL
Qty: 8 | Refills: 0 | Status: ACTIVE | COMMUNITY

## 2023-05-26 RX ORDER — OXYCODONE HYDROCHLORIDE AND ACETAMINOPHEN 5; 325 MG/1; MG/1
TAKE 1 TO 2 TABLETS BY MOUTH EVERY 4 HOURS AS NEEDED FOR PAIN TABLET ORAL
Qty: 39 | Refills: 0 | Status: ACTIVE | COMMUNITY

## 2023-05-26 RX ORDER — CLOTRIMAZOLE 1 G/100G
APPLY TO AFFECTED AREA 2 TIMES A DAY CREAM TOPICAL
Qty: 30 | Refills: 0 | Status: ACTIVE | COMMUNITY

## 2023-05-26 RX ORDER — BUPROPION HYDROCHLORIDE 150 MG/1
TABLET, EXTENDED RELEASE ORAL
Qty: 90 | Status: ACTIVE | COMMUNITY

## 2023-05-26 RX ORDER — CEFUROXIME AXETIL 500 MG/1
TAKE 1 TABLET BY MOUTH TWICE DAILY TABLET, FILM COATED ORAL
Qty: 20 | Refills: 0 | Status: ACTIVE | COMMUNITY

## 2023-05-26 RX ORDER — OXYCODONE HYDROCHLORIDE 5 MG/1
TABLET ORAL
Qty: 75 | Status: ACTIVE | COMMUNITY

## 2023-05-26 RX ORDER — PRAVASTATIN SODIUM 40 MG/1
TABLET ORAL
Qty: 90 | Status: ACTIVE | COMMUNITY

## 2023-05-26 RX ORDER — ACETAMINOPHEN AND CODEINE PHOSPHATE 300; 30 MG/1; MG/1
TABLET ORAL
Qty: 15 | Status: ACTIVE | COMMUNITY

## 2023-05-26 RX ORDER — PANTOPRAZOLE 40 MG/1
TABLET, DELAYED RELEASE ORAL
Qty: 30 | Status: ACTIVE | COMMUNITY

## 2023-05-26 RX ORDER — HYOSCYAMINE SULFATE 0.12 MG/1
TAKE 1 TABLET BY MOUTH FOUR TIMES DAILY AS NEEDED FOR ABDOMINAL PAIN OR SPASM TABLET ORAL
Qty: 30 | Refills: 0 | Status: ACTIVE | COMMUNITY

## 2023-05-26 RX ORDER — LEVOTHYROXINE SODIUM 0.17 MG/1
TABLET ORAL
Qty: 90 | Status: ACTIVE | COMMUNITY

## 2023-05-26 RX ORDER — SULFAMETHOXAZOLE AND TRIMETHOPRIM 400; 80 MG/1; MG/1
TABLET ORAL
Qty: 28 | Status: ACTIVE | COMMUNITY

## 2023-05-26 RX ORDER — AMLODIPINE BESYLATE 5 MG/1
TAKE 1 TABLET BY MOUTH EVERY DAY TABLET ORAL
Qty: 5 | Refills: 1 | Status: ACTIVE | COMMUNITY

## 2023-05-26 RX ORDER — ACETAZOLAMIDE 125 MG/1
TAKE 1 TO 2 TABLETS BY MOUTH EVERY DAY AS DIRECTED TABLET ORAL
Qty: 30 | Refills: 0 | Status: ACTIVE | COMMUNITY

## 2023-05-26 RX ORDER — HYDROCODONE BITARTRATE AND ACETAMINOPHEN 5; 325 MG/1; MG/1
TABLET ORAL
Qty: 40 | Status: ACTIVE | COMMUNITY

## 2023-05-26 RX ORDER — CELECOXIB 200 MG/1
CAPSULE ORAL
Qty: 90 | Status: ACTIVE | COMMUNITY

## 2023-05-26 RX ORDER — MELOXICAM 15 MG/1
TABLET ORAL
Qty: 30 | Status: ACTIVE | COMMUNITY

## 2023-05-26 RX ORDER — MONTELUKAST SODIUM 10 MG/1
TAKE 1 TABLET BY MOUTH EVERY NIGHT TABLET, FILM COATED ORAL
Qty: 90 | Refills: 0 | Status: ACTIVE | COMMUNITY

## 2023-05-26 RX ORDER — BUDESONIDE AND FORMOTEROL FUMARATE DIHYDRATE 80; 4.5 UG/1; UG/1
AEROSOL RESPIRATORY (INHALATION)
Qty: 30.6 | Status: ACTIVE | COMMUNITY

## 2023-05-26 RX ORDER — DULOXETINE 30 MG/1
CAPSULE, DELAYED RELEASE ORAL
Qty: 90 | Status: ACTIVE | COMMUNITY

## 2023-05-26 RX ORDER — HUMAN IMMUNOGLOBULIN G 0.2 G/ML
LIQUID SUBCUTANEOUS
Qty: 400 | Status: ACTIVE | COMMUNITY

## 2023-05-26 RX ORDER — TIZANIDINE 4 MG/1
TAKE 1 TABLET BY MOUTH EVERY 8 HOURS AS NEEDED TABLET ORAL
Qty: 90 | Refills: 0 | Status: ACTIVE | COMMUNITY

## 2023-05-26 RX ORDER — ONDANSETRON 4 MG/1
1-2 TABLET ON THE TONGUE AND ALLOW TO DISSOLVE TABLET, ORALLY DISINTEGRATING ORAL
Qty: 60 | Refills: 1 | Status: ACTIVE | COMMUNITY

## 2023-05-26 NOTE — HPI-OTHER HISTORIES
CT ABDOMEN AND PELVIS WITHOUT CONTRAST [facility]  CLINICAL HISTORY: Left lower quadrant pain, diarrhea.  TECHNIQUE: CT scan of the abdomen and pelvis was performed without the administration of contrast intravenously. Oral contrast was administered. Reformatted coronal and sagittal images were also submitted for review.   This exam was performed utilizing automated exposure control as a radiation dose lowering technique.  COMPARISON: December 2, 2022   FINDINGS:    The visualized lung bases are clear.    The heart is normal in size.  Evaluation of the solid abdominal organs is compromised by the lack of intravenous contrast.   The liver, gallbladder, spleen, bilateral adrenal glands, pancreas, bilateral kidneys appear grossly normal. The bladder is normal. There is been prior hysterectomy. There is a pessary present.  There is no bowel obstruction. There is wall thickening, with mild surrounding inflammatory stranding involving the descending colon, including from the level of the splenic flexure, with multiple diverticula noted.  No abdominal or pelvic lymphadenopathy is noted. The abdominal aorta is normal in caliber.  There are post surgical changes from prior posterior spinal fusion procedure from the lower thoracic spine to the sacrum, with laminectomies at multiple levels involving the lumbar spine, and prior anterior lumbar interbody fusion procedure involving the lumbar spine.   IMPRESSION:  1.  No bowel obstruction. Mild diffuse wall thickening with surrounding inflammatory stranding involving the descending colon, in the region of multiple diverticula. This may represent a colitis, including from infectious etiologies or less likely ischemic colitis, with diverticulitis an alternative possibility although felt to be less likely given the long segment of involvement. 2.  No intraperitoneal free air or ascites noted.

## 2023-05-26 NOTE — HPI-TODAY'S VISIT:
HEre to follow up from the ER  had a BM today that was "OK " with less blood ; a little pain in abdomen  She does weekly IVIG at home.  took some headache medication  had a regular dinner : salmon that was leftover in the  then in the middle of the night - started to have multiple BM

## 2023-05-29 ENCOUNTER — DASHBOARD ENCOUNTERS (OUTPATIENT)
Age: 78
End: 2023-05-29

## 2024-07-29 ENCOUNTER — ERX REFILL RESPONSE (OUTPATIENT)
Dept: URBAN - METROPOLITAN AREA CLINIC 98 | Facility: CLINIC | Age: 79
End: 2024-07-29

## 2024-07-29 RX ORDER — DICYCLOMINE HYDROCHLORIDE 20 MG/1
1 TABLET TABLET ORAL THREE TIMES A DAY
Qty: 90 | Refills: 1 | OUTPATIENT

## 2024-07-29 RX ORDER — DICYCLOMINE HYDROCHLORIDE 20 MG/1
TAKE 1 TABLET BY MOUTH THREE TIMES DAILY AS NEEDED FOR PAIN TABLET ORAL
Qty: 270 TABLET | Refills: 1 | OUTPATIENT

## 2024-08-08 ENCOUNTER — TELEPHONE ENCOUNTER (OUTPATIENT)
Dept: URBAN - METROPOLITAN AREA CLINIC 98 | Facility: CLINIC | Age: 79
End: 2024-08-08

## 2024-08-15 ENCOUNTER — OFFICE VISIT (OUTPATIENT)
Dept: URBAN - METROPOLITAN AREA CLINIC 98 | Facility: CLINIC | Age: 79
End: 2024-08-15
Payer: MEDICARE

## 2024-08-15 VITALS
BODY MASS INDEX: 30.65 KG/M2 | DIASTOLIC BLOOD PRESSURE: 77 MMHG | HEIGHT: 63 IN | TEMPERATURE: 97.2 F | HEART RATE: 65 BPM | SYSTOLIC BLOOD PRESSURE: 145 MMHG | WEIGHT: 173 LBS

## 2024-08-15 DIAGNOSIS — K55.9 ISCHEMIC COLITIS: ICD-10-CM

## 2024-08-15 DIAGNOSIS — K58.0 IRRITABLE BOWEL SYNDROME WITH DIARRHEA: ICD-10-CM

## 2024-08-15 PROBLEM — 30588004: Status: ACTIVE | Noted: 2024-08-15

## 2024-08-15 PROBLEM — 197125005: Status: ACTIVE | Noted: 2024-08-15

## 2024-08-15 PROCEDURE — 99213 OFFICE O/P EST LOW 20 MIN: CPT | Performed by: INTERNAL MEDICINE

## 2024-08-15 RX ORDER — GABAPENTIN 300 MG/1
TAKE 1 CAPSULE BY MOUTH AT BEDTIME CAPSULE ORAL
Qty: 30 | Refills: 0 | Status: ACTIVE | COMMUNITY

## 2024-08-15 RX ORDER — VANCOMYCIN HYDROCHLORIDE 125 MG/1
TAKE 1 CAPSULE BY MOUTH EVERY 6 HOURS FOR 10 DAYS CAPSULE ORAL
Qty: 40 | Refills: 0 | Status: ACTIVE | COMMUNITY

## 2024-08-15 RX ORDER — AMLODIPINE BESYLATE 5 MG/1
TAKE 1 TABLET BY MOUTH EVERY DAY TABLET ORAL
Qty: 5 | Refills: 1 | Status: ACTIVE | COMMUNITY

## 2024-08-15 RX ORDER — PREDNISONE 20 MG/1
TAKE 3 TABLETS BY MOUTH EVERY DAY TABLET ORAL
Qty: 15 | Refills: 0 | Status: ACTIVE | COMMUNITY

## 2024-08-15 RX ORDER — HYOSCYAMINE SULFATE 0.12 MG/1
TAKE 1 TABLET BY MOUTH FOUR TIMES DAILY AS NEEDED FOR ABDOMINAL PAIN OR SPASM TABLET ORAL
Qty: 30 | Refills: 0 | Status: ACTIVE | COMMUNITY

## 2024-08-15 RX ORDER — ACETAMINOPHEN AND CODEINE PHOSPHATE 300; 30 MG/1; MG/1
TABLET ORAL
Qty: 15 | Status: ACTIVE | COMMUNITY

## 2024-08-15 RX ORDER — OXYCODONE HYDROCHLORIDE AND ACETAMINOPHEN 5; 325 MG/1; MG/1
TAKE 1 TO 2 TABLETS BY MOUTH EVERY 4 HOURS AS NEEDED FOR PAIN TABLET ORAL
Qty: 39 | Refills: 0 | Status: ACTIVE | COMMUNITY

## 2024-08-15 RX ORDER — CLOTRIMAZOLE 1 G/100G
APPLY TO AFFECTED AREA 2 TIMES A DAY CREAM TOPICAL
Qty: 30 | Refills: 0 | Status: ACTIVE | COMMUNITY

## 2024-08-15 RX ORDER — FLUCONAZOLE 150 MG/1
TAKE 1 TABLET BY MOUTH 1 TIME. REPEAT IN 3 DAYS IF STILL WITH SYMPTOMS TABLET ORAL
Qty: 2 | Refills: 0 | Status: ACTIVE | COMMUNITY

## 2024-08-15 RX ORDER — TRAMADOL HYDROCHLORIDE 50 MG/1
TAKE 1 TABLET BY MOUTH FOUR TIMES DAILY TABLET, COATED ORAL
Qty: 120 | Refills: 0 | Status: ACTIVE | COMMUNITY

## 2024-08-15 RX ORDER — DULOXETINE 30 MG/1
CAPSULE, DELAYED RELEASE ORAL
Qty: 90 | Status: ACTIVE | COMMUNITY

## 2024-08-15 RX ORDER — MELOXICAM 15 MG/1
TABLET ORAL
Qty: 30 | Status: ACTIVE | COMMUNITY

## 2024-08-15 RX ORDER — ACETAZOLAMIDE 125 MG/1
TAKE 1 TO 2 TABLETS BY MOUTH EVERY DAY AS DIRECTED TABLET ORAL
Qty: 30 | Refills: 0 | Status: ACTIVE | COMMUNITY

## 2024-08-15 RX ORDER — ONDANSETRON 4 MG/1
1-2 TABLET ON THE TONGUE AND ALLOW TO DISSOLVE TABLET, ORALLY DISINTEGRATING ORAL
Qty: 60 | Refills: 1 | Status: ACTIVE | COMMUNITY

## 2024-08-15 RX ORDER — PRAVASTATIN SODIUM 40 MG/1
TABLET ORAL
Qty: 90 | Status: ACTIVE | COMMUNITY

## 2024-08-15 RX ORDER — MONTELUKAST SODIUM 10 MG/1
TAKE 1 TABLET BY MOUTH EVERY NIGHT TABLET, FILM COATED ORAL
Qty: 90 | Refills: 0 | Status: ACTIVE | COMMUNITY

## 2024-08-15 RX ORDER — CEFUROXIME AXETIL 500 MG/1
TAKE 1 TABLET BY MOUTH TWICE DAILY TABLET, FILM COATED ORAL
Qty: 20 | Refills: 0 | Status: ACTIVE | COMMUNITY

## 2024-08-15 RX ORDER — OFLOXACIN OTIC 3 MG/ML
INSTILL 1 DROP LEFT EYE FOUR TIMES DAILY FOR 5-7 DAYS SOLUTION AURICULAR (OTIC)
Qty: 5 | Refills: 0 | Status: ACTIVE | COMMUNITY

## 2024-08-15 RX ORDER — ESTRADIOL 0.1 MG/G
_INSERT 1 GRAM VAGINALLY TWICE WEEKLY CREAM VAGINAL
Qty: 42.5 | Refills: 0 | Status: ACTIVE | COMMUNITY

## 2024-08-15 RX ORDER — HUMAN IMMUNOGLOBULIN G 0.2 G/ML
LIQUID SUBCUTANEOUS
Qty: 400 | Status: ACTIVE | COMMUNITY

## 2024-08-15 RX ORDER — CELECOXIB 200 MG/1
CAPSULE ORAL
Qty: 90 | Status: ACTIVE | COMMUNITY

## 2024-08-15 RX ORDER — OXYCODONE HYDROCHLORIDE 5 MG/1
TABLET ORAL
Qty: 75 | Status: ACTIVE | COMMUNITY

## 2024-08-15 RX ORDER — TRAZODONE HYDROCHLORIDE 50 MG/1
TABLET ORAL
Qty: 90 | Status: ACTIVE | COMMUNITY

## 2024-08-15 RX ORDER — BENAZEPRIL HYDROCHLORIDE AND HYDROCHLOROTHIAZIDE 20; 12.5 MG/1; MG/1
TABLET ORAL
Qty: 90 | Status: ACTIVE | COMMUNITY

## 2024-08-15 RX ORDER — METHYLPREDNISOLONE 8 MG/1
TAKE 4 TABLETS 12 HOURS PRIOR TO EXAM AND 4 TABLETS 2 HOURS PRIOR TO EXAM TABLET ORAL
Qty: 8 | Refills: 0 | Status: ACTIVE | COMMUNITY

## 2024-08-15 RX ORDER — BUPROPION HYDROCHLORIDE 150 MG/1
TABLET, EXTENDED RELEASE ORAL
Qty: 90 | Status: ACTIVE | COMMUNITY

## 2024-08-15 RX ORDER — BUTALBITAL, ACETAMINOPHEN AND CAFFEINE 40; 50; 300 MG/1; MG/1; MG/1
TAKE 1 CAPSULE BY MOUTH EVERY 8 HOURS AS NEEDED DIAGNOSIS UNAVAILABLE CAPSULE ORAL
Qty: 90 | Refills: 0 | Status: ACTIVE | COMMUNITY

## 2024-08-15 RX ORDER — EPINEPHRINE 0.3 MG/.3ML
INJECTION SUBCUTANEOUS
Qty: 1 | Status: ACTIVE | COMMUNITY

## 2024-08-15 RX ORDER — BUDESONIDE AND FORMOTEROL FUMARATE DIHYDRATE 80; 4.5 UG/1; UG/1
AEROSOL RESPIRATORY (INHALATION)
Qty: 30.6 | Status: ACTIVE | COMMUNITY

## 2024-08-15 RX ORDER — LEVOTHYROXINE SODIUM 0.17 MG/1
TABLET ORAL
Qty: 90 | Status: ACTIVE | COMMUNITY

## 2024-08-15 RX ORDER — PANTOPRAZOLE 40 MG/1
TABLET, DELAYED RELEASE ORAL
Qty: 30 | Status: ACTIVE | COMMUNITY

## 2024-08-15 RX ORDER — DICYCLOMINE HYDROCHLORIDE 20 MG/1
1 TABLET TABLET ORAL THREE TIMES A DAY
Qty: 90 | Refills: 1 | Status: ACTIVE | COMMUNITY

## 2024-08-15 RX ORDER — SULFAMETHOXAZOLE AND TRIMETHOPRIM 400; 80 MG/1; MG/1
TABLET ORAL
Qty: 28 | Status: ACTIVE | COMMUNITY

## 2024-08-15 RX ORDER — HYDROCODONE BITARTRATE AND ACETAMINOPHEN 5; 325 MG/1; MG/1
TABLET ORAL
Qty: 40 | Status: ACTIVE | COMMUNITY

## 2024-08-15 RX ORDER — TIZANIDINE 4 MG/1
TAKE 1 TABLET BY MOUTH EVERY 8 HOURS AS NEEDED TABLET ORAL
Qty: 90 | Refills: 0 | Status: ACTIVE | COMMUNITY

## 2024-08-15 NOTE — HPI-TODAY'S VISIT:
Was in [place] at [facility] - ate a lot of cold cuts  then had pain and bleeding  had colonoscopy 7/17/24 : Dr Olivia [facility]  erythema in descending and splenic c/w ischemic colitis since then : no more pain or bleeding . right foot in boot ; going to have surgery soon

## 2025-03-06 ENCOUNTER — TELEPHONE ENCOUNTER (OUTPATIENT)
Dept: URBAN - METROPOLITAN AREA CLINIC 96 | Facility: CLINIC | Age: 80
End: 2025-03-06

## 2025-03-06 RX ORDER — ONDANSETRON 4 MG/1
1-2 TABLET ON THE TONGUE AND ALLOW TO DISSOLVE TABLET, ORALLY DISINTEGRATING ORAL
Qty: 60 | Refills: 1

## 2025-03-31 ENCOUNTER — OFFICE VISIT (OUTPATIENT)
Dept: URBAN - METROPOLITAN AREA CLINIC 98 | Facility: CLINIC | Age: 80
End: 2025-03-31

## 2025-03-31 RX ORDER — MONTELUKAST SODIUM 10 MG/1
TAKE 1 TABLET BY MOUTH EVERY NIGHT TABLET, FILM COATED ORAL
Qty: 90 | Refills: 0 | Status: ACTIVE | COMMUNITY

## 2025-03-31 RX ORDER — PRAVASTATIN SODIUM 40 MG/1
TABLET ORAL
Qty: 90 | Status: ACTIVE | COMMUNITY

## 2025-03-31 RX ORDER — BENAZEPRIL HYDROCHLORIDE AND HYDROCHLOROTHIAZIDE 20; 12.5 MG/1; MG/1
TABLET ORAL
Qty: 90 | Status: ACTIVE | COMMUNITY

## 2025-03-31 RX ORDER — TRAMADOL HYDROCHLORIDE 50 MG/1
TAKE 1 TABLET BY MOUTH FOUR TIMES DAILY TABLET, COATED ORAL
Qty: 120 | Refills: 0 | Status: ACTIVE | COMMUNITY

## 2025-03-31 RX ORDER — GABAPENTIN 300 MG/1
TAKE 1 CAPSULE BY MOUTH AT BEDTIME CAPSULE ORAL
Qty: 30 | Refills: 0 | Status: ACTIVE | COMMUNITY

## 2025-03-31 RX ORDER — CEFUROXIME AXETIL 500 MG/1
TAKE 1 TABLET BY MOUTH TWICE DAILY TABLET, FILM COATED ORAL
Qty: 20 | Refills: 0 | Status: ON HOLD | COMMUNITY

## 2025-03-31 RX ORDER — LEVOTHYROXINE SODIUM 0.17 MG/1
TABLET ORAL
Qty: 90 | Status: ACTIVE | COMMUNITY

## 2025-03-31 RX ORDER — CELECOXIB 200 MG/1
CAPSULE ORAL
Qty: 90 | Status: ACTIVE | COMMUNITY

## 2025-03-31 RX ORDER — METHYLPREDNISOLONE 8 MG/1
TAKE 4 TABLETS 12 HOURS PRIOR TO EXAM AND 4 TABLETS 2 HOURS PRIOR TO EXAM TABLET ORAL
Qty: 8 | Refills: 0 | Status: ACTIVE | COMMUNITY

## 2025-03-31 RX ORDER — CLOTRIMAZOLE 1 G/100G
APPLY TO AFFECTED AREA 2 TIMES A DAY CREAM TOPICAL
Qty: 30 | Refills: 0 | Status: ACTIVE | COMMUNITY

## 2025-03-31 RX ORDER — SULFAMETHOXAZOLE AND TRIMETHOPRIM 400; 80 MG/1; MG/1
TABLET ORAL
Qty: 28 | Status: ON HOLD | COMMUNITY

## 2025-03-31 RX ORDER — PREDNISONE 20 MG/1
TAKE 3 TABLETS BY MOUTH EVERY DAY TABLET ORAL
Qty: 15 | Refills: 0 | Status: ON HOLD | COMMUNITY

## 2025-03-31 RX ORDER — OXYCODONE HYDROCHLORIDE 5 MG/1
TABLET ORAL
Qty: 75 | Status: ON HOLD | COMMUNITY

## 2025-03-31 RX ORDER — TIZANIDINE 4 MG/1
TAKE 1 TABLET BY MOUTH EVERY 8 HOURS AS NEEDED TABLET ORAL
Qty: 90 | Refills: 0 | Status: ACTIVE | COMMUNITY

## 2025-03-31 RX ORDER — FLUCONAZOLE 150 MG/1
TAKE 1 TABLET BY MOUTH 1 TIME. REPEAT IN 3 DAYS IF STILL WITH SYMPTOMS TABLET ORAL
Qty: 2 | Refills: 0 | Status: ON HOLD | COMMUNITY

## 2025-03-31 RX ORDER — TRAZODONE HYDROCHLORIDE 50 MG/1
TABLET ORAL
Qty: 90 | Status: ACTIVE | COMMUNITY

## 2025-03-31 RX ORDER — OFLOXACIN OTIC 3 MG/ML
INSTILL 1 DROP LEFT EYE FOUR TIMES DAILY FOR 5-7 DAYS SOLUTION AURICULAR (OTIC)
Qty: 5 | Refills: 0 | Status: ACTIVE | COMMUNITY

## 2025-03-31 RX ORDER — BUDESONIDE AND FORMOTEROL FUMARATE DIHYDRATE 80; 4.5 UG/1; UG/1
AEROSOL RESPIRATORY (INHALATION)
Qty: 30.6 | Status: ACTIVE | COMMUNITY

## 2025-03-31 RX ORDER — EPINEPHRINE 0.3 MG/.3ML
INJECTION SUBCUTANEOUS
Qty: 1 | Status: ACTIVE | COMMUNITY

## 2025-03-31 RX ORDER — ACETAZOLAMIDE 125 MG/1
TAKE 1 TO 2 TABLETS BY MOUTH EVERY DAY AS DIRECTED TABLET ORAL
Qty: 30 | Refills: 0 | Status: ACTIVE | COMMUNITY

## 2025-03-31 RX ORDER — BUTALBITAL, ACETAMINOPHEN AND CAFFEINE 40; 50; 300 MG/1; MG/1; MG/1
TAKE 1 CAPSULE BY MOUTH EVERY 8 HOURS AS NEEDED DIAGNOSIS UNAVAILABLE CAPSULE ORAL
Qty: 90 | Refills: 0 | Status: ACTIVE | COMMUNITY

## 2025-03-31 RX ORDER — AMLODIPINE BESYLATE 5 MG/1
TAKE 1 TABLET BY MOUTH EVERY DAY TABLET ORAL
Qty: 5 | Refills: 1 | Status: ACTIVE | COMMUNITY

## 2025-03-31 RX ORDER — HYOSCYAMINE SULFATE 0.12 MG/1
TAKE 1 TABLET BY MOUTH FOUR TIMES DAILY AS NEEDED FOR ABDOMINAL PAIN OR SPASM TABLET ORAL
Qty: 30 | Refills: 0 | Status: ACTIVE | COMMUNITY

## 2025-03-31 RX ORDER — HYDROCODONE BITARTRATE AND ACETAMINOPHEN 5; 325 MG/1; MG/1
TABLET ORAL
Qty: 40 | Status: ACTIVE | COMMUNITY

## 2025-03-31 RX ORDER — DULOXETINE 30 MG/1
CAPSULE, DELAYED RELEASE ORAL
Qty: 90 | Status: ACTIVE | COMMUNITY

## 2025-03-31 NOTE — HPI-OTHER HISTORIES
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 right foot in boot ; going to have surgery soon

## 2025-03-31 NOTE — HPI-TODAY'S VISIT:
3/31/25- Coco Clark, NP - 81 yo female here with complaints of constipation and rectal bleeding - Patient of Dr. Smith - PCP Dr. Cindi Childress - 6-8 weeks ago had COVID and since constipation increased - Takes: Gundry's (red berries) every 3 days, magnisium citrate sip as needed, then miralax as needed - After she takes laxatives has rectal bleeding and anal pain - Colonoscopy 7/30/24 with [facility]- unremarkable - Last rectal bleeding 6 weeks ago - Hx. of hemorrhoid repair and anal fissures

## 2025-03-31 NOTE — PHYSICAL EXAM HENT:
Head, normocephalic, atraumatic, Face, Face within normal limits, Ears, External ears within normal limits
Dr. Montes De Oca

## 2025-05-06 ENCOUNTER — OFFICE VISIT (OUTPATIENT)
Dept: URBAN - METROPOLITAN AREA CLINIC 98 | Facility: CLINIC | Age: 80
End: 2025-05-06
Payer: MEDICARE

## 2025-05-06 DIAGNOSIS — K59.01 CONSTIPATION BY DELAYED COLONIC TRANSIT: ICD-10-CM

## 2025-05-06 DIAGNOSIS — K64.8 INTERNAL HEMORRHOIDS: ICD-10-CM

## 2025-05-06 PROCEDURE — 99213 OFFICE O/P EST LOW 20 MIN: CPT

## 2025-05-06 RX ORDER — ACETAZOLAMIDE 125 MG/1
TAKE 1 TO 2 TABLETS BY MOUTH EVERY DAY AS DIRECTED TABLET ORAL
Qty: 30 | Refills: 0 | Status: ACTIVE | COMMUNITY

## 2025-05-06 RX ORDER — TRAZODONE HYDROCHLORIDE 50 MG/1
TABLET ORAL
Qty: 90 | Status: ACTIVE | COMMUNITY

## 2025-05-06 RX ORDER — LEVOTHYROXINE SODIUM 0.17 MG/1
TABLET ORAL
Qty: 90 | Status: ACTIVE | COMMUNITY

## 2025-05-06 RX ORDER — TRAMADOL HYDROCHLORIDE 50 MG/1
TAKE 1 TABLET BY MOUTH FOUR TIMES DAILY TABLET, COATED ORAL
Qty: 120 | Refills: 0 | Status: ACTIVE | COMMUNITY

## 2025-05-06 RX ORDER — HYOSCYAMINE SULFATE 0.12 MG/1
TAKE 1 TABLET BY MOUTH FOUR TIMES DAILY AS NEEDED FOR ABDOMINAL PAIN OR SPASM TABLET ORAL
Qty: 30 | Refills: 0 | Status: ACTIVE | COMMUNITY

## 2025-05-06 RX ORDER — OXYCODONE HYDROCHLORIDE 5 MG/1
TABLET ORAL
Qty: 75 | Status: ON HOLD | COMMUNITY

## 2025-05-06 RX ORDER — BENAZEPRIL HYDROCHLORIDE AND HYDROCHLOROTHIAZIDE 20; 12.5 MG/1; MG/1
TABLET ORAL
Qty: 90 | Status: ACTIVE | COMMUNITY

## 2025-05-06 RX ORDER — METHYLPREDNISOLONE 8 MG/1
TAKE 4 TABLETS 12 HOURS PRIOR TO EXAM AND 4 TABLETS 2 HOURS PRIOR TO EXAM TABLET ORAL
Qty: 8 | Refills: 0 | Status: ACTIVE | COMMUNITY

## 2025-05-06 RX ORDER — CLOTRIMAZOLE 1 G/100G
APPLY TO AFFECTED AREA 2 TIMES A DAY CREAM TOPICAL
Qty: 30 | Refills: 0 | Status: ACTIVE | COMMUNITY

## 2025-05-06 RX ORDER — PRAVASTATIN SODIUM 40 MG/1
TABLET ORAL
Qty: 90 | Status: ACTIVE | COMMUNITY

## 2025-05-06 RX ORDER — CELECOXIB 200 MG/1
CAPSULE ORAL
Qty: 90 | Status: ACTIVE | COMMUNITY

## 2025-05-06 RX ORDER — CEFUROXIME AXETIL 500 MG/1
TAKE 1 TABLET BY MOUTH TWICE DAILY TABLET, FILM COATED ORAL
Qty: 20 | Refills: 0 | Status: ON HOLD | COMMUNITY

## 2025-05-06 RX ORDER — BUDESONIDE AND FORMOTEROL FUMARATE DIHYDRATE 80; 4.5 UG/1; UG/1
AEROSOL RESPIRATORY (INHALATION)
Qty: 30.6 | Status: ACTIVE | COMMUNITY

## 2025-05-06 RX ORDER — DULOXETINE 30 MG/1
CAPSULE, DELAYED RELEASE ORAL
Qty: 90 | Status: ACTIVE | COMMUNITY

## 2025-05-06 RX ORDER — MONTELUKAST SODIUM 10 MG/1
TAKE 1 TABLET BY MOUTH EVERY NIGHT TABLET, FILM COATED ORAL
Qty: 90 | Refills: 0 | Status: ACTIVE | COMMUNITY

## 2025-05-06 RX ORDER — OFLOXACIN OTIC 3 MG/ML
INSTILL 1 DROP LEFT EYE FOUR TIMES DAILY FOR 5-7 DAYS SOLUTION AURICULAR (OTIC)
Qty: 5 | Refills: 0 | Status: ACTIVE | COMMUNITY

## 2025-05-06 RX ORDER — EPINEPHRINE 0.3 MG/.3ML
INJECTION SUBCUTANEOUS
Qty: 1 | Status: ACTIVE | COMMUNITY

## 2025-05-06 RX ORDER — PREDNISONE 20 MG/1
TAKE 3 TABLETS BY MOUTH EVERY DAY TABLET ORAL
Qty: 15 | Refills: 0 | Status: ON HOLD | COMMUNITY

## 2025-05-06 RX ORDER — BUTALBITAL, ACETAMINOPHEN AND CAFFEINE 40; 50; 300 MG/1; MG/1; MG/1
TAKE 1 CAPSULE BY MOUTH EVERY 8 HOURS AS NEEDED DIAGNOSIS UNAVAILABLE CAPSULE ORAL
Qty: 90 | Refills: 0 | Status: ACTIVE | COMMUNITY

## 2025-05-06 RX ORDER — AMLODIPINE BESYLATE 5 MG/1
TAKE 1 TABLET BY MOUTH EVERY DAY TABLET ORAL
Qty: 5 | Refills: 1 | Status: ACTIVE | COMMUNITY

## 2025-05-06 RX ORDER — GABAPENTIN 300 MG/1
TAKE 1 CAPSULE BY MOUTH AT BEDTIME CAPSULE ORAL
Qty: 30 | Refills: 0 | Status: ACTIVE | COMMUNITY

## 2025-05-06 RX ORDER — HYDROCODONE BITARTRATE AND ACETAMINOPHEN 5; 325 MG/1; MG/1
TABLET ORAL
Qty: 40 | Status: ACTIVE | COMMUNITY

## 2025-05-06 RX ORDER — SULFAMETHOXAZOLE AND TRIMETHOPRIM 400; 80 MG/1; MG/1
TABLET ORAL
Qty: 28 | Status: ON HOLD | COMMUNITY

## 2025-05-06 RX ORDER — TIZANIDINE 4 MG/1
TAKE 1 TABLET BY MOUTH EVERY 8 HOURS AS NEEDED TABLET ORAL
Qty: 90 | Refills: 0 | Status: ACTIVE | COMMUNITY

## 2025-05-06 RX ORDER — FLUCONAZOLE 150 MG/1
TAKE 1 TABLET BY MOUTH 1 TIME. REPEAT IN 3 DAYS IF STILL WITH SYMPTOMS TABLET ORAL
Qty: 2 | Refills: 0 | Status: ON HOLD | COMMUNITY

## 2025-05-06 NOTE — HPI-TODAY'S VISIT:
5/6/25- Coco Clark, NP - 79 yo female here to follow-up, hemorrhoids still inflamed and constipation - Tried miralax and fiber  - Last visit given linzess samples did not try - Takes tramadol with pain management MD.  Unsure the name - Now taking unknown medication once a day, Miralax 1 capful BID and 1 teaspoon of fiber - BM every day with the above regimen; then stopped taking regular - Colonoscopy 7/2024 with other GI

## 2025-05-06 NOTE — HPI-OTHER HISTORIES
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 right foot in boot ; going to have surgery soon  3/31/25- Coco Clark, NP - 79 yo female here with complaints of constipation and rectal bleeding - Patient of Dr. Smith - PCP Dr. Cindi Childress - 6-8 weeks ago had COVID and since constipation increased - Takes: Gundry's (red berries) every 3 days, magnisium citrate sip as needed, then miralax as needed - After she takes laxatives has rectal bleeding and anal pain - Colonoscopy 7/30/24 with [facility]- unremarkable - Last rectal bleeding 6 weeks ago - Hx. of hemorrhoid repair and anal fissures

## 2025-08-07 ENCOUNTER — OFFICE VISIT (OUTPATIENT)
Dept: URBAN - METROPOLITAN AREA CLINIC 98 | Facility: CLINIC | Age: 80
End: 2025-08-07

## 2025-08-18 ENCOUNTER — OFFICE VISIT (OUTPATIENT)
Dept: URBAN - METROPOLITAN AREA CLINIC 98 | Facility: CLINIC | Age: 80
End: 2025-08-18
Payer: MEDICARE

## 2025-08-18 DIAGNOSIS — K64.8 INTERNAL HEMORRHOIDS: ICD-10-CM

## 2025-08-18 DIAGNOSIS — R03.0 ELEVATED BLOOD PRESSURE READING: ICD-10-CM

## 2025-08-18 DIAGNOSIS — K59.01 CONSTIPATION BY DELAYED COLONIC TRANSIT: ICD-10-CM

## 2025-08-18 PROCEDURE — 99212 OFFICE O/P EST SF 10 MIN: CPT

## 2025-08-18 RX ORDER — METHYLPREDNISOLONE 8 MG/1
TAKE 4 TABLETS 12 HOURS PRIOR TO EXAM AND 4 TABLETS 2 HOURS PRIOR TO EXAM TABLET ORAL
Qty: 8 | Refills: 0 | Status: ON HOLD | COMMUNITY

## 2025-08-18 RX ORDER — LEVOTHYROXINE SODIUM 0.17 MG/1
TABLET ORAL
Qty: 90 | Status: ACTIVE | COMMUNITY

## 2025-08-18 RX ORDER — ACETAZOLAMIDE 125 MG/1
TAKE 1 TO 2 TABLETS BY MOUTH EVERY DAY AS DIRECTED TABLET ORAL
Qty: 30 | Refills: 0 | Status: ACTIVE | COMMUNITY

## 2025-08-18 RX ORDER — BENAZEPRIL HYDROCHLORIDE AND HYDROCHLOROTHIAZIDE 20; 12.5 MG/1; MG/1
TABLET ORAL
Qty: 90 | Status: ACTIVE | COMMUNITY

## 2025-08-18 RX ORDER — ATORVASTATIN CALCIUM 20 MG/1
1 TABLET TABLET, FILM COATED ORAL ONCE A DAY
Status: ACTIVE | COMMUNITY
Start: 2025-08-18

## 2025-08-18 RX ORDER — TIZANIDINE 4 MG/1
TAKE 1 TABLET BY MOUTH EVERY 8 HOURS AS NEEDED TABLET ORAL
Qty: 90 | Refills: 0 | Status: ACTIVE | COMMUNITY

## 2025-08-18 RX ORDER — GABAPENTIN 300 MG/1
TAKE 1 CAPSULE BY MOUTH AT BEDTIME CAPSULE ORAL
Qty: 30 | Refills: 0 | Status: ACTIVE | COMMUNITY

## 2025-08-18 RX ORDER — TRAZODONE HYDROCHLORIDE 50 MG/1
TABLET ORAL
Qty: 90 | Status: ACTIVE | COMMUNITY

## 2025-08-18 RX ORDER — PREDNISONE 20 MG/1
TAKE 3 TABLETS BY MOUTH EVERY DAY TABLET ORAL
Qty: 15 | Refills: 0 | Status: ON HOLD | COMMUNITY

## 2025-08-18 RX ORDER — FLUCONAZOLE 150 MG/1
TAKE 1 TABLET BY MOUTH 1 TIME. REPEAT IN 3 DAYS IF STILL WITH SYMPTOMS TABLET ORAL
Qty: 2 | Refills: 0 | Status: ON HOLD | COMMUNITY

## 2025-08-18 RX ORDER — CEFUROXIME AXETIL 500 MG/1
TAKE 1 TABLET BY MOUTH TWICE DAILY TABLET, FILM COATED ORAL
Qty: 20 | Refills: 0 | Status: ON HOLD | COMMUNITY

## 2025-08-18 RX ORDER — BUDESONIDE AND FORMOTEROL FUMARATE DIHYDRATE 80; 4.5 UG/1; UG/1
AEROSOL RESPIRATORY (INHALATION)
Qty: 30.6 | Status: ACTIVE | COMMUNITY

## 2025-08-18 RX ORDER — TRAMADOL HYDROCHLORIDE 50 MG/1
TAKE 1 TABLET BY MOUTH FOUR TIMES DAILY TABLET, COATED ORAL
Qty: 120 | Refills: 0 | Status: ACTIVE | COMMUNITY

## 2025-08-18 RX ORDER — DULOXETINE 30 MG/1
CAPSULE, DELAYED RELEASE ORAL
Qty: 90 | Status: ACTIVE | COMMUNITY

## 2025-08-18 RX ORDER — OXYCODONE HYDROCHLORIDE 5 MG/1
TABLET ORAL
Qty: 75 | Status: ON HOLD | COMMUNITY

## 2025-08-18 RX ORDER — EPINEPHRINE 0.3 MG/.3ML
INJECTION SUBCUTANEOUS
Qty: 1 | Status: ACTIVE | COMMUNITY

## 2025-08-18 RX ORDER — HYDROCODONE BITARTRATE AND ACETAMINOPHEN 5; 325 MG/1; MG/1
TABLET ORAL
Qty: 40 | Status: ACTIVE | COMMUNITY

## 2025-08-18 RX ORDER — PRAVASTATIN SODIUM 40 MG/1
TABLET ORAL
Qty: 90 | Status: ACTIVE | COMMUNITY

## 2025-08-18 RX ORDER — OFLOXACIN OTIC 3 MG/ML
INSTILL 1 DROP LEFT EYE FOUR TIMES DAILY FOR 5-7 DAYS SOLUTION AURICULAR (OTIC)
Qty: 5 | Refills: 0 | Status: ACTIVE | COMMUNITY

## 2025-08-18 RX ORDER — HYOSCYAMINE SULFATE 0.12 MG/1
TAKE 1 TABLET BY MOUTH FOUR TIMES DAILY AS NEEDED FOR ABDOMINAL PAIN OR SPASM TABLET ORAL
Qty: 30 | Refills: 0 | Status: ON HOLD | COMMUNITY

## 2025-08-18 RX ORDER — AMLODIPINE BESYLATE 5 MG/1
TAKE 1 TABLET BY MOUTH EVERY DAY TABLET ORAL
Qty: 5 | Refills: 1 | Status: ACTIVE | COMMUNITY

## 2025-08-18 RX ORDER — CLOTRIMAZOLE 1 G/100G
APPLY TO AFFECTED AREA 2 TIMES A DAY CREAM TOPICAL
Qty: 30 | Refills: 0 | Status: ACTIVE | COMMUNITY

## 2025-08-18 RX ORDER — CELECOXIB 200 MG/1
CAPSULE ORAL
Qty: 90 | Status: ACTIVE | COMMUNITY

## 2025-08-18 RX ORDER — MONTELUKAST SODIUM 10 MG/1
TAKE 1 TABLET BY MOUTH EVERY NIGHT TABLET, FILM COATED ORAL
Qty: 90 | Refills: 0 | Status: ACTIVE | COMMUNITY

## 2025-08-18 RX ORDER — BUTALBITAL, ACETAMINOPHEN AND CAFFEINE 40; 50; 300 MG/1; MG/1; MG/1
TAKE 1 CAPSULE BY MOUTH EVERY 8 HOURS AS NEEDED DIAGNOSIS UNAVAILABLE CAPSULE ORAL
Qty: 90 | Refills: 0 | Status: ACTIVE | COMMUNITY

## 2025-08-18 RX ORDER — SULFAMETHOXAZOLE AND TRIMETHOPRIM 400; 80 MG/1; MG/1
TABLET ORAL
Qty: 28 | Status: ON HOLD | COMMUNITY